# Patient Record
Sex: FEMALE | Race: WHITE | NOT HISPANIC OR LATINO | Employment: FULL TIME | URBAN - METROPOLITAN AREA
[De-identification: names, ages, dates, MRNs, and addresses within clinical notes are randomized per-mention and may not be internally consistent; named-entity substitution may affect disease eponyms.]

---

## 2018-01-14 ENCOUNTER — APPOINTMENT (OUTPATIENT)
Dept: ULTRASOUND IMAGING | Facility: CLINIC | Age: 33
End: 2018-01-14
Attending: EMERGENCY MEDICINE
Payer: COMMERCIAL

## 2018-01-14 ENCOUNTER — HOSPITAL ENCOUNTER (EMERGENCY)
Facility: CLINIC | Age: 33
Discharge: HOME | End: 2018-01-14
Attending: EMERGENCY MEDICINE | Admitting: EMERGENCY MEDICINE
Payer: COMMERCIAL

## 2018-01-14 VITALS
OXYGEN SATURATION: 100 % | RESPIRATION RATE: 20 BRPM | TEMPERATURE: 98.8 F | DIASTOLIC BLOOD PRESSURE: 92 MMHG | HEART RATE: 105 BPM | SYSTOLIC BLOOD PRESSURE: 133 MMHG

## 2018-01-14 DIAGNOSIS — O03.9 COMPLETE MISCARRIAGE: ICD-10-CM

## 2018-01-14 PROCEDURE — 76801 OB US < 14 WKS SINGLE FETUS: CPT

## 2018-01-14 PROCEDURE — 99284 EMERGENCY DEPT VISIT MOD MDM: CPT | Mod: 25

## 2018-01-14 ASSESSMENT — ENCOUNTER SYMPTOMS
FEVER: 0
VOMITING: 0
NAUSEA: 0
RESPIRATORY NEGATIVE: 1

## 2018-01-14 NOTE — ED AVS SNAPSHOT
[facility] Emergency Department    [address]    [facility] [address]    Phone:  [phone]    Fax:  [phone]                                       Dinora Squires   MRN: [id number]    Department:  [facility] Emergency Department   Date of Visit:  1/14/2018           After Visit Summary Signature Page     I have received my discharge instructions, and my questions have been answered. I have discussed any challenges I see with this plan with the nurse or doctor.    ..........................................................................................................................................  Patient/Patient Representative Signature      ..........................................................................................................................................  Patient Representative Print Name and Relationship to Patient    ..................................................               ................................................  Date                                            Time    ..........................................................................................................................................  Reviewed by Signature/Title    ...................................................              ..............................................  Date                                                            Time

## 2018-01-14 NOTE — ED AVS SNAPSHOT
[facility] Emergency Department    [address]    [facility] [address]    Phone:  [phone]    Fax:  [phone]                                       Dinora Squires   MRN: [id number]    Department:  [facility] Emergency Department   Date of Visit:  1/14/2018           Patient Information     Date Of Birth          1985        Your diagnoses for this visit were:     Complete miscarriage        You were seen by Nata Cody MD.      Follow-up Information     Follow up with OB/Gyn.        Follow up with [facility] Emergency Department.    Specialty:  EMERGENCY MEDICINE    Why:  As needed, If symptoms worsen    Contact information:    [address]  [facility] [address] [phone]      Discharge References/Attachments     MISCARRIAGE, SPONTANEOUS (COMPLETED) (ENGLISH)    MISCARRIAGE, UNDERSTANDING: EMOTIONS (ENGLISH)    MISCARRIAGE, UNDERSTANDING: POSSIBLE CAUSES (ENGLISH)      24 Hour Appointment Hotline       To make an appointment at any [facility] clinic, call [phone] ([phone]). If you don't have a family doctor or clinic, we will help you find one. [facility] clinics are conveniently located to serve the needs of you and your family.             Review of your medicines      Notice     You have not been prescribed any medications.            Procedures and tests performed during your visit     US OB <14 Weeks W Transvaginal      Orders Needing Specimen Collection     None      Pending Results     No orders found from 1/12/2018 to 1/15/2018.            Pending Culture Results     No orders found from 1/12/2018 to 1/15/2018.            Pending Results Instructions     If you had any lab results that were not finalized at the time of your Discharge, you can call the ED Lab Result RN at [phone]. You will be contacted by this team for any positive Lab results or changes in treatment. The nurses are  available 7 days a week from 10A to 6:30P.  You can leave a message 24 hours per day and they will return your call.        Test Results From Your Hospital Stay        1/14/2018  5:55 AM      Narrative     US OB <14 WEEKS WITH TRANSVAGINAL SINGLE  1/14/2018 5:50 AM    HISTORY: Bleeding in early pregnancy.    TECHNIQUE: Transabdominal and transvaginal imaging were performed.   Transvaginal imaging was performed to better evaluate the uterus and  gestational sac.    COMPARISON:  None.    FINDINGS:      No gestational sac, yolk sac or embryo. Endometrial stripe thickness  is 1.3 cm.    Right ovary: Unremarkable.  Left ovary: Unremarkable.  Adnexal mass: None.  Free pelvic fluid: None.        Impression     IMPRESSION: No evidence of intrauterine pregnancy.    FLORECITA SAN MD                Clinical Quality Measure: Blood Pressure Screening     Your blood pressure was checked while you were in the emergency department today. The last reading we obtained was  BP: (!) 133/92 . Please read the guidelines below about what these numbers mean and what you should do about them.  If your systolic blood pressure (the top number) is less than 120 and your diastolic blood pressure (the bottom number) is less than 80, then your blood pressure is normal. There is nothing more that you need to do about it.  If your systolic blood pressure (the top number) is 120-139 or your diastolic blood pressure (the bottom number) is 80-89, your blood pressure may be higher than it should be. You should have your blood pressure rechecked within a year by a primary care provider.  If your systolic blood pressure (the top number) is 140 or greater or your diastolic blood pressure (the bottom number) is 90 or greater, you may have high blood pressure. High blood pressure is treatable, but if left untreated over time it can put you at risk for heart attack, stroke, or kidney failure. You should have your blood pressure rechecked by a primary care  "provider within the next 4 weeks.  If your provider in the emergency department today gave you specific instructions to follow-up with your doctor or provider even sooner than that, you should follow that instruction and not wait for up to 4 weeks for your follow-up visit.        Thank you for choosing [facility]       Thank you for choosing [facility] for your care. Our goal is always to provide you with excellent care. Hearing back from our patients is one way we can continue to improve our services. Please take a few minutes to complete the written survey that you may receive in the mail after you visit with us. Thank you!        [employer] Information     [facility] [employer] lets you send messages to your doctor, view your test results, renew your prescriptions, schedule appointments and more. To sign up, go to [email or web address] . Click on \"Log in\" on the left side of the screen, which will take you to the Welcome page. Then click on \"Sign up Now\" on the right side of the page.     You will be asked to enter the access code listed below, as well as some personal information. Please follow the directions to create your username and password.     Your access code is: [email or web address]  Expires: 2018  6:08 AM     Your access code will  in 90 days. If you need help or a new code, please call your [facility] clinic or [phone].        Care EveryWhere ID     This is your Care EveryWhere ID. This could be used by other organizations to access your [facility] medical records  [id number]        Equal Access to Services     JAILYN LEMOS : Hadii leroy lópezo Somiriam, waaxda luqadaha, qaybta kaalmada felisa barnes . So [facility] [phone].    ATENCIÓN: Si habla español, tiene a flowers disposición servicios gratuitos de asistencia lingüística. Llame al [phone].    We comply with applicable federal civil rights laws and [place] laws. We do not discriminate on the basis of race, color, " national origin, age, disability, sex, sexual orientation, or gender identity.            After Visit Summary       This is your record. Keep this with you and show to your community pharmacist(s) and doctor(s) at your next visit.

## 2018-01-14 NOTE — ED PROVIDER NOTES
History     Chief Complaint:  Vaginal Bleeding      HPI   Dinora Squires is a pregnant 32 year old female who presents to the emergency department today for evaluation of vaginal bleeding. The patient reports constant abdominal cramping during her 9 weeks of pregnancy. The patient reports she was seen on Monday by her OBGYN for a similar issue of vaginal bleeding. They performed ultrasounds that came back normal. However, they told her that if her vaginal bleeding turned bright red, she should go into the emergency department. She noticed bright red blood with small clots tonight prompting her visit to the emergency department. She denies fever, nausea, vomiting, respiratory issues.  First pregnancy.     Allergies:  No Known Drug Allergies    Medications:    The patient is currently on no regular medications.    Past Medical History:    History reviewed. No pertinent past medical history.    Past Surgical History:    History reviewed. No pertinent surgical history.    Family History:    History reviewed. No pertinent family history.     Social History:  The patient was accompanied to the ED by .  Marital Status:       Review of Systems   Constitutional: Negative for fever.   Respiratory: Negative.    Gastrointestinal: Negative for nausea and vomiting.   Genitourinary: Positive for vaginal bleeding and vaginal discharge.        Clotting   All other systems reviewed and are negative.      Physical Exam   First Vitals:  BP: (!) 133/92  Pulse: 105  Temp: 98.8  F (37.1  C)  Resp: 20  SpO2: 100 %      Physical Exam  Eyes:  Sclera white; Pupils are equal and round  ENT:    External ears and nares normal  CV:  Rate as above with regular rhythm   Resp:  Breath sounds clear and equal bilaterally    Non-labored, no retractions or accessory muscle use  GI:  Abdomen is soft, non-tender, non-distended    No rebound tenderness or peritoneal features  MS:  Moves all extremities  Skin:  Warm and  dry  Neuro:  Speech is normal and fluent. No apparent deficit.          Emergency Department Course   Imaging:  Radiology findings were communicated with the patient and family who voiced understanding of the findings.  US OB<14 Weeks w Transvag  IMPRESSION: No evidence of intrauterine pregnancy.  Report per radiology     Emergency Department Course:  Nursing notes and vitals reviewed.  The patient was sent for a US OB<14 Weeks w Transvag while in the emergency department, results above.   0439: I performed an exam of the patient as documented above.   0548: 6:26 AM: I spoke with ultrasound regarding patient's presentation, findings, and plan of care.  0608: Patient rechecked and updated.   Findings and plan explained to the Patient and spouse. Patient discharged home with instructions regarding supportive care, medications, and reasons to return. The importance of close follow-up was reviewed.   I personally reviewed the imaging results with the Patient and spouse and answered all related questions prior to discharge.      Impression & Plan    Medical Decision Making:  Dolly Squires is a 32 year old female here for evaluation of cramping and bright red bleeding in first trimester pregnancy. She has had a recent ultrasound showing an IUP, but given the change in her symptoms, repeating this to ensure there was not fetal demise was appropriate. She has paperwork with her stating her blood type is AB+. Given her close following with OB, I don't think there is any indication at this point to be doing STD checks at this time today. Ultrasound was obtained and results were discussed with the ultrasonographer. There is an empty uterus at this point. I discussed this with the patient. She stated that she went to the bathroom right before the ultrasound and passed a much larger bloody mas. I suspect that this was the time she passed the miscarriage. She is not having a significant increase in bleeding after the ultrasound  and I do not suspect any entrapped products at the cervical os. No indication for D&C at this time. Questions about miscarriages were answered and I encouraged her to follow up with her obstetrician.     Diagnosis:    ICD-10-CM    1. Complete miscarriage O03.9        Disposition:  discharged to home    Scribe Disclosure:  CHLOE Marlo Wrenlilly, am serving as a scribe at 4:39 AM on 1/14/2018 to document services personally performed by Nata Cody MD based on my observations and the provider's statements to me.     1/14/2018   [facility] EMERGENCY DEPARTMENT       Nata Cody MD  01/14/18 0283

## 2018-01-14 NOTE — LETTER
[facility] EMERGENCY DEPARTMENT  [address]  [facility] [address]  [phone]      2018    Dinora Squires  [address] [facility] [address]  There are no phone numbers on file.    : 1985      To Whom it may concern:    Dinora Squires was seen in our Emergency Department today, 2018.  Return to work is based on symptom improvement.  Based on this she may not be at work Monday or Tuesday.    Sincerely,      Nata Cody MD

## 2018-11-05 LAB
ABO + RH BLD: NORMAL
ABO + RH BLD: NORMAL
C TRACH DNA SPEC QL PROBE+SIG AMP: NORMAL
HBV SURFACE AG SERPL QL IA: NORMAL
HIV 1+2 AB+HIV1 P24 AG SERPL QL IA: NORMAL
N GONORRHOEA DNA SPEC QL PROBE+SIG AMP: NORMAL
RUBELLA ANTIBODY IGG QUANTITATIVE: NORMAL IU/ML

## 2019-05-31 ENCOUNTER — HOSPITAL ENCOUNTER (INPATIENT)
Facility: CLINIC | Age: 34
LOS: 2 days | Discharge: HOME | End: 2019-06-02
Attending: OBSTETRICS & GYNECOLOGY | Admitting: OBSTETRICS & GYNECOLOGY
Payer: COMMERCIAL

## 2019-05-31 DIAGNOSIS — O13.3 GESTATIONAL HYPERTENSION W/O SIGNIFICANT PROTEINURIA IN 3RD TRIMESTER: Primary | ICD-10-CM

## 2019-05-31 PROBLEM — O60.00 PRETERM LABOR: Status: ACTIVE | Noted: 2019-05-31

## 2019-05-31 LAB
ABO + RH BLD: NORMAL
ABO + RH BLD: NORMAL
BASOPHILS # BLD AUTO: 0 10E9/L (ref 0–0.2)
BASOPHILS NFR BLD AUTO: 0.1 %
DIFFERENTIAL METHOD BLD: ABNORMAL
EOSINOPHIL # BLD AUTO: 0.1 10E9/L (ref 0–0.7)
EOSINOPHIL NFR BLD AUTO: 0.3 %
ERYTHROCYTE [DISTWIDTH] IN BLOOD BY AUTOMATED COUNT: 12.7 % (ref 10–15)
HCT VFR BLD AUTO: 33.8 % (ref 35–47)
HGB BLD-MCNC: 11.6 G/DL (ref 11.7–15.7)
IMM GRANULOCYTES # BLD: 0.2 10E9/L (ref 0–0.4)
IMM GRANULOCYTES NFR BLD: 1.3 %
LYMPHOCYTES # BLD AUTO: 2.1 10E9/L (ref 0.8–5.3)
LYMPHOCYTES NFR BLD AUTO: 13.1 %
MCH RBC QN AUTO: 32 PG (ref 26.5–33)
MCHC RBC AUTO-ENTMCNC: 34.3 G/DL (ref 31.5–36.5)
MCV RBC AUTO: 93 FL (ref 78–100)
MONOCYTES # BLD AUTO: 1 10E9/L (ref 0–1.3)
MONOCYTES NFR BLD AUTO: 5.8 %
NEUTROPHILS # BLD AUTO: 13 10E9/L (ref 1.6–8.3)
NEUTROPHILS NFR BLD AUTO: 79.4 %
NRBC # BLD AUTO: 0 10*3/UL
NRBC BLD AUTO-RTO: 0 /100
PLATELET # BLD AUTO: 297 10E9/L (ref 150–450)
RBC # BLD AUTO: 3.63 10E12/L (ref 3.8–5.2)
SPECIMEN EXP DATE BLD: NORMAL
WBC # BLD AUTO: 16.3 10E9/L (ref 4–11)

## 2019-05-31 PROCEDURE — 96374 THER/PROPH/DIAG INJ IV PUSH: CPT

## 2019-05-31 PROCEDURE — 86780 TREPONEMA PALLIDUM: CPT | Performed by: OBSTETRICS & GYNECOLOGY

## 2019-05-31 PROCEDURE — 25000128 H RX IP 250 OP 636

## 2019-05-31 PROCEDURE — 85025 COMPLETE CBC W/AUTO DIFF WBC: CPT | Performed by: OBSTETRICS & GYNECOLOGY

## 2019-05-31 PROCEDURE — G0378 HOSPITAL OBSERVATION PER HR: HCPCS

## 2019-05-31 PROCEDURE — 86900 BLOOD TYPING SEROLOGIC ABO: CPT | Performed by: OBSTETRICS & GYNECOLOGY

## 2019-05-31 PROCEDURE — 36415 COLL VENOUS BLD VENIPUNCTURE: CPT | Performed by: OBSTETRICS & GYNECOLOGY

## 2019-05-31 PROCEDURE — 59025 FETAL NON-STRESS TEST: CPT

## 2019-05-31 PROCEDURE — 12000000 ZZH R&B MED SURG/OB

## 2019-05-31 PROCEDURE — 86901 BLOOD TYPING SEROLOGIC RH(D): CPT | Performed by: OBSTETRICS & GYNECOLOGY

## 2019-05-31 PROCEDURE — 25000128 H RX IP 250 OP 636: Performed by: OBSTETRICS & GYNECOLOGY

## 2019-05-31 PROCEDURE — G0463 HOSPITAL OUTPT CLINIC VISIT: HCPCS | Mod: 25

## 2019-05-31 PROCEDURE — 96376 TX/PRO/DX INJ SAME DRUG ADON: CPT

## 2019-05-31 PROCEDURE — 25800030 ZZH RX IP 258 OP 636: Performed by: OBSTETRICS & GYNECOLOGY

## 2019-05-31 RX ORDER — ONDANSETRON 2 MG/ML
4 INJECTION INTRAMUSCULAR; INTRAVENOUS EVERY 6 HOURS PRN
Status: DISCONTINUED | OUTPATIENT
Start: 2019-05-31 | End: 2019-06-02 | Stop reason: HOSPADM

## 2019-05-31 RX ORDER — PENICILLIN G POTASSIUM 5000000 [IU]/1
5 INJECTION, POWDER, FOR SOLUTION INTRAMUSCULAR; INTRAVENOUS ONCE
Status: COMPLETED | OUTPATIENT
Start: 2019-05-31 | End: 2019-05-31

## 2019-05-31 RX ORDER — OXYCODONE AND ACETAMINOPHEN 5; 325 MG/1; MG/1
1 TABLET ORAL
Status: DISCONTINUED | OUTPATIENT
Start: 2019-05-31 | End: 2019-06-02 | Stop reason: HOSPADM

## 2019-05-31 RX ORDER — CARBOPROST TROMETHAMINE 250 UG/ML
250 INJECTION, SOLUTION INTRAMUSCULAR
Status: DISCONTINUED | OUTPATIENT
Start: 2019-05-31 | End: 2019-06-02 | Stop reason: HOSPADM

## 2019-05-31 RX ORDER — FENTANYL CITRATE 50 UG/ML
50-100 INJECTION, SOLUTION INTRAMUSCULAR; INTRAVENOUS
Status: DISCONTINUED | OUTPATIENT
Start: 2019-05-31 | End: 2019-06-02 | Stop reason: HOSPADM

## 2019-05-31 RX ORDER — METHYLERGONOVINE MALEATE 0.2 MG/ML
200 INJECTION INTRAVENOUS
Status: DISCONTINUED | OUTPATIENT
Start: 2019-05-31 | End: 2019-06-02 | Stop reason: HOSPADM

## 2019-05-31 RX ORDER — OXYTOCIN 10 [USP'U]/ML
10 INJECTION, SOLUTION INTRAMUSCULAR; INTRAVENOUS
Status: DISCONTINUED | OUTPATIENT
Start: 2019-05-31 | End: 2019-06-02 | Stop reason: HOSPADM

## 2019-05-31 RX ORDER — SODIUM CHLORIDE, SODIUM LACTATE, POTASSIUM CHLORIDE, CALCIUM CHLORIDE 600; 310; 30; 20 MG/100ML; MG/100ML; MG/100ML; MG/100ML
INJECTION, SOLUTION INTRAVENOUS CONTINUOUS
Status: DISCONTINUED | OUTPATIENT
Start: 2019-05-31 | End: 2019-06-02 | Stop reason: HOSPADM

## 2019-05-31 RX ORDER — PRENATAL VIT/IRON FUM/FOLIC AC 27MG-0.8MG
1 TABLET ORAL DAILY
COMMUNITY

## 2019-05-31 RX ORDER — PENICILLIN G POTASSIUM 5000000 [IU]/1
INJECTION, POWDER, FOR SOLUTION INTRAMUSCULAR; INTRAVENOUS
Status: DISCONTINUED
Start: 2019-05-31 | End: 2019-05-31 | Stop reason: HOSPADM

## 2019-05-31 RX ORDER — ACETAMINOPHEN 325 MG/1
650 TABLET ORAL EVERY 4 HOURS PRN
Status: DISCONTINUED | OUTPATIENT
Start: 2019-05-31 | End: 2019-06-02 | Stop reason: HOSPADM

## 2019-05-31 RX ORDER — NALOXONE HYDROCHLORIDE 0.4 MG/ML
.1-.4 INJECTION, SOLUTION INTRAMUSCULAR; INTRAVENOUS; SUBCUTANEOUS
Status: DISCONTINUED | OUTPATIENT
Start: 2019-05-31 | End: 2019-06-02 | Stop reason: HOSPADM

## 2019-05-31 RX ORDER — OXYTOCIN/0.9 % SODIUM CHLORIDE 30/500 ML
100-340 PLASTIC BAG, INJECTION (ML) INTRAVENOUS CONTINUOUS PRN
Status: DISCONTINUED | OUTPATIENT
Start: 2019-05-31 | End: 2019-06-02 | Stop reason: HOSPADM

## 2019-05-31 RX ORDER — IBUPROFEN 400 MG/1
800 TABLET, FILM COATED ORAL
Status: DISCONTINUED | OUTPATIENT
Start: 2019-05-31 | End: 2019-06-02 | Stop reason: HOSPADM

## 2019-05-31 RX ADMIN — PENICILLIN G POTASSIUM 5 MILLION UNITS: 5000000 POWDER, FOR SOLUTION INTRAMUSCULAR; INTRAPLEURAL; INTRATHECAL; INTRAVENOUS at 18:47

## 2019-05-31 RX ADMIN — PENICILLIN G POTASSIUM 5 MILLION UNITS: 5000000 INJECTION, POWDER, FOR SOLUTION INTRAMUSCULAR; INTRAVENOUS at 18:47

## 2019-05-31 RX ADMIN — SODIUM CHLORIDE, POTASSIUM CHLORIDE, SODIUM LACTATE AND CALCIUM CHLORIDE: 600; 310; 30; 20 INJECTION, SOLUTION INTRAVENOUS at 18:48

## 2019-05-31 RX ADMIN — SODIUM CHLORIDE 2.5 MILLION UNITS: 9 INJECTION, SOLUTION INTRAVENOUS at 22:47

## 2019-05-31 SDOH — HEALTH STABILITY: MENTAL HEALTH: HOW OFTEN DO YOU HAVE A DRINK CONTAINING ALCOHOL?: NEVER

## 2019-05-31 ASSESSMENT — MIFFLIN-ST. JEOR: SCORE: 1534.61

## 2019-05-31 ASSESSMENT — ACTIVITIES OF DAILY LIVING (ADL)
COGNITION: 0 - NO COGNITION ISSUES REPORTED
TRANSFERRING: 0-->INDEPENDENT
FALL_HISTORY_WITHIN_LAST_SIX_MONTHS: NO
DRESS: 0-->INDEPENDENT
TOILETING: 0-->INDEPENDENT
BATHING: 0-->INDEPENDENT
RETIRED_COMMUNICATION: 0-->UNDERSTANDS/COMMUNICATES WITHOUT DIFFICULTY
RETIRED_EATING: 0-->INDEPENDENT
AMBULATION: 0-->INDEPENDENT
SWALLOWING: 0-->SWALLOWS FOODS/LIQUIDS WITHOUT DIFFICULTY

## 2019-05-31 NOTE — PLAN OF CARE
Primip at 36+2 sent over from the clinic after SVE 3-4/70/0 for monitoring for 2 hrs and SVE after. Pt denies leaking fluid, no vaginal bleeding, no contractions and good fetal movement. POC discussed with pt and . Monitors applied, assessment obtained. Orientated to room and call light.

## 2019-05-31 NOTE — PROVIDER NOTIFICATION
05/31/19 1810   Provider Notification   Provider Name/Title Dr. Michel   Method of Notification At Bedside   Request Evaluate in Person   Notification Reason SVE   Dr. Michel at bedside. SVE, cervical change. POC discussed with pt. Starting antibiotics per unknown GBS protocol. Pt being admitted overnight for observation.

## 2019-06-01 PROCEDURE — 25800030 ZZH RX IP 258 OP 636: Performed by: OBSTETRICS & GYNECOLOGY

## 2019-06-01 PROCEDURE — 12000000 ZZH R&B MED SURG/OB

## 2019-06-01 PROCEDURE — 25000128 H RX IP 250 OP 636: Performed by: OBSTETRICS & GYNECOLOGY

## 2019-06-01 RX ORDER — CALCIUM CARBONATE 500 MG/1
500 TABLET, CHEWABLE ORAL DAILY PRN
Status: DISCONTINUED | OUTPATIENT
Start: 2019-06-01 | End: 2019-06-02 | Stop reason: HOSPADM

## 2019-06-01 RX ADMIN — SODIUM CHLORIDE 2.5 MILLION UNITS: 9 INJECTION, SOLUTION INTRAVENOUS at 18:35

## 2019-06-01 RX ADMIN — SODIUM CHLORIDE 2.5 MILLION UNITS: 9 INJECTION, SOLUTION INTRAVENOUS at 10:30

## 2019-06-01 RX ADMIN — SODIUM CHLORIDE 2.5 MILLION UNITS: 9 INJECTION, SOLUTION INTRAVENOUS at 02:44

## 2019-06-01 RX ADMIN — SODIUM CHLORIDE, POTASSIUM CHLORIDE, SODIUM LACTATE AND CALCIUM CHLORIDE 125 ML/HR: 600; 310; 30; 20 INJECTION, SOLUTION INTRAVENOUS at 02:48

## 2019-06-01 RX ADMIN — SODIUM CHLORIDE 2.5 MILLION UNITS: 9 INJECTION, SOLUTION INTRAVENOUS at 22:30

## 2019-06-01 RX ADMIN — SODIUM CHLORIDE 2.5 MILLION UNITS: 9 INJECTION, SOLUTION INTRAVENOUS at 07:00

## 2019-06-01 RX ADMIN — SODIUM CHLORIDE 2.5 MILLION UNITS: 9 INJECTION, SOLUTION INTRAVENOUS at 14:35

## 2019-06-01 NOTE — PROGRESS NOTES
"OB Antepartum Note - Hospital Day 2    S:  Patient is doing well.  few contractions.  no vaginal bleeding.  Baby active.  No LOF.  Bowels moving.  Tolerating diet.      O:  /84   Temp 99.2  F (37.3  C) (Temporal)   Resp 16   Ht 1.727 m (5' 8\")   Wt 77.1 kg (170 lb)   BMI 25.85 kg/m    Gen-A&O, NAD  Abd- Gravid, non-tender  EFM-  Baseline 160, accels +, mod variability, no decelerations  [place]- ctx q10 minutes  Cervix-  6/80/0 per Dr Michel exam this am     GBS pending from clinic    A/P: 33 year old  @ 36w3d HD # 2 admitted with ptl     1.  Routine cares  2.  GBS unknown: continue PCN will saline lock in between  3.  Fetal and uterine monitoring: q shift due to stability for the last 6 hrs.     Cash Fitzgerald  2019  11:00 AM     "

## 2019-06-01 NOTE — PLAN OF CARE
Dr. Fitzgerald at bedside at 1050 to assess patient, discussed POC, reviewed strip. Patient having occasional contractions where she feels mild cramping, PCN being given every 4 hours for unknown GBS status. Plan to continue abx, monitor q shift unless any changes. Pt and  state understanding.

## 2019-06-01 NOTE — PROGRESS NOTES
"OB Post-partum Note  PPD# 2    S:  Patient doing well.  Pain controlled.  Voiding.  Bleeding normal.  Breast feeding.    O:  /79   Temp 99  F (37.2  C) (Temporal)   Resp 16   Ht 1.727 m (5' 8\")   Wt 77.1 kg (170 lb)   BMI 25.85 kg/m    Gen- A&O, NAD  Abd- Non-tender, fundus firm at umbilicus  Ext- non-tender, trace edema    Hemoglobin   Date Value Ref Range Status   2019 11.6 (L) 11.7 - 15.7 g/dL Final     AB+  Rubella Immune    A/P: 33 year old  PPD# 2 s/p     1.  Routine post-partum cares.  2.  GHTN - bps normal. F/u bp check 1 week.  3.  Anticipate d/c home on PPD# 2.    Key Michel MD  2019  7:28 AM  "

## 2019-06-01 NOTE — PLAN OF CARE
Pt has no cervical change in 4 hrs. Dr. Michel updated, plan is to observe patient and reassess in the morning.

## 2019-06-01 NOTE — H&P
"OB Brief Admit H&P    No significant change in general health status based on examination of the patient, review of Nursing Admission Database and prenatal record.    Pt is a 33 year old  @ 36w2d who presented to L&D with advanced cervical dilation.  Pt seen in clinic today for routine care and found to be 4 cm dilated. Instructed to present to MAC for r/o PTL. Denied painful contractions, vaginal bleeding, or leakage of fluid. Endorsed active fetal movement.    On evaluation, patient ekv every 4 minutes but denied significant pain. Over 2 hour cervix changed to 5 cm. Currently feeling cramping 2/10 and some pelvic pressure.    Patient's prenatal course has been uncomplicated.    Prenatal Labs:    Blood type AB+  Rubella immune  GCT 94  GBS pending    EFW: 5.5 lbs    /72   Temp 99  F (37.2  C) (Temporal)   Resp 18   Ht 1.727 m (5' 8\")   Wt 77.1 kg (170 lb)   BMI 25.85 kg/m    EFM:  130, moderate variability, ++accels, no decels (category 1)  [place]icken: Q4 min  SVE: 6/80%/0 (still slightly posterior)  Membranes:  intact    Assessment:  33 year old  @ 36w2d admitted for  labor. GBS unknown.    Plan:  1. Admit to labor and delivery   2. PCN for GBS unknown and . Culture sent earlier today.  3. Expectant management for now, consider active management if >6 cm.  4. Ok for epidural.  5. Anticipate .  6. S/p Tdap this pregnancy.    Key Michel MD  2019  9:01 PM      "

## 2019-06-02 VITALS
DIASTOLIC BLOOD PRESSURE: 68 MMHG | WEIGHT: 170 LBS | TEMPERATURE: 98.3 F | BODY MASS INDEX: 25.76 KG/M2 | SYSTOLIC BLOOD PRESSURE: 118 MMHG | HEIGHT: 68 IN | RESPIRATION RATE: 16 BRPM

## 2019-06-02 LAB — T PALLIDUM AB SER QL: NONREACTIVE

## 2019-06-02 PROCEDURE — 25800030 ZZH RX IP 258 OP 636: Performed by: OBSTETRICS & GYNECOLOGY

## 2019-06-02 PROCEDURE — 25000128 H RX IP 250 OP 636: Performed by: OBSTETRICS & GYNECOLOGY

## 2019-06-02 PROCEDURE — 25000132 ZZH RX MED GY IP 250 OP 250 PS 637: Performed by: OBSTETRICS & GYNECOLOGY

## 2019-06-02 RX ADMIN — SODIUM CHLORIDE 2.5 MILLION UNITS: 9 INJECTION, SOLUTION INTRAVENOUS at 06:33

## 2019-06-02 RX ADMIN — SODIUM CHLORIDE 2.5 MILLION UNITS: 9 INJECTION, SOLUTION INTRAVENOUS at 02:35

## 2019-06-02 RX ADMIN — CALCIUM CARBONATE (ANTACID) CHEW TAB 500 MG 500 MG: 500 CHEW TAB at 00:05

## 2019-06-02 NOTE — DISCHARGE INSTRUCTIONS
Discharge Instruction for Undelivered Patients      You were seen for: Cervical change  We Consulted: Dr. Michel  You had (Test or Medicine):Monitoring and SVE     Diet:   Drink 8 to 12 glasses of liquids (milk, juice, water) every day.  You may eat meals and snacks.     Activity:  Rest the pelvic area. No sex. Do not stimulate breasts or nipples.  Call your doctor or nurse midwife if your baby is moving less than usual.     Call your provider if you notice:  Swelling in your face or increased swelling in your hands or legs.  Headaches that are not relieved by Tylenol (acetaminophen).  Changes in your vision (blurring: seeing spots or stars.)  Nausea (sick to your stomach) and vomiting (throwing up).   Weight gain of 5 pounds or more per week.  Heartburn that doesn't go away.  Signs of bladder infection: pain when you urinate (use the toilet), need to go more often and more urgently.  The bag of mosquera (rupture of membranes) breaks, or you notice leaking in your underwear.  Bright red blood in your underwear.  Abdominal (lower belly) or stomach pain.  For first baby: Contractions (tightening) less than 5 minutes apart for one hour or more.  Second (plus) baby: Contractions (tightening) less than 10 minutes apart and getting stronger.  *If less than 34 weeks: Contractions (tightenings) more than 6 times in one hour.  Increase or change in vaginal discharge (note the color and amount)      Follow-up:  Make an appointment to be seen on Tuesday 6/4/2019

## 2019-06-02 NOTE — PROGRESS NOTES
"OB Progress Note  2019  8:20 AM    S:  Pt feeling well. Denies cramping, contractions or leakage of fluid. Endorses some bloody discharge. Baby active.    O:  /68   Temp 98.3  F (36.8  C) (Temporal)   Resp 16   Ht 1.727 m (5' 8\")   Wt 77.1 kg (170 lb)   BMI 25.85 kg/m     Abd - soft non-tender.  EFM: baseline 135, accelerations present, no decelerations, moderate variability; Category 1  [place]:  Ctx q3-4 min  SVE:  6/80%/-1 (posterior)  Membranes: intact    A/P:  33 year old  @36w4d admitted with advanced cervical dilation,  labor. GBS pending.  1.  Advanced cervical dilation - now stable x 24 hours. No evidence of abruption or IUI. Cervix remains posterior and no more than 6 cm.   2.  Plan to discharge home with modified activity.   3.  Follow up 2 days.  4.  Consider IOL 37-38 weeks.  5.  Call for any increase in cramping, contractions, vaginal bleeding, leakage of fluid, or decreased vaginal bleeding.  6.  The plan of care has been discussed with patient and her  who are in agreement.    Key Michel MD    "

## 2019-06-19 ENCOUNTER — HOSPITAL ENCOUNTER (INPATIENT)
Facility: CLINIC | Age: 34
LOS: 1 days | Discharge: HOME | End: 2019-06-20
Attending: OBSTETRICS & GYNECOLOGY | Admitting: OBSTETRICS & GYNECOLOGY
Payer: COMMERCIAL

## 2019-06-19 LAB
ABO + RH BLD: NORMAL
ABO + RH BLD: NORMAL
SPECIMEN EXP DATE BLD: NORMAL
T PALLIDUM AB SER QL: NONREACTIVE

## 2019-06-19 PROCEDURE — 25800030 ZZH RX IP 258 OP 636: Performed by: OBSTETRICS & GYNECOLOGY

## 2019-06-19 PROCEDURE — 25000128 H RX IP 250 OP 636: Performed by: OBSTETRICS & GYNECOLOGY

## 2019-06-19 PROCEDURE — 86901 BLOOD TYPING SEROLOGIC RH(D): CPT | Performed by: OBSTETRICS & GYNECOLOGY

## 2019-06-19 PROCEDURE — 36415 COLL VENOUS BLD VENIPUNCTURE: CPT | Performed by: OBSTETRICS & GYNECOLOGY

## 2019-06-19 PROCEDURE — 25000132 ZZH RX MED GY IP 250 OP 250 PS 637: Performed by: OBSTETRICS & GYNECOLOGY

## 2019-06-19 PROCEDURE — 25000125 ZZHC RX 250: Performed by: OBSTETRICS & GYNECOLOGY

## 2019-06-19 PROCEDURE — 86780 TREPONEMA PALLIDUM: CPT | Performed by: OBSTETRICS & GYNECOLOGY

## 2019-06-19 PROCEDURE — 12000000 ZZH R&B MED SURG/OB

## 2019-06-19 PROCEDURE — 72200001 ZZH LABOR CARE VAGINAL DELIVERY SINGLE

## 2019-06-19 PROCEDURE — 0KQM0ZZ REPAIR PERINEUM MUSCLE, OPEN APPROACH: ICD-10-PCS | Performed by: OBSTETRICS & GYNECOLOGY

## 2019-06-19 PROCEDURE — 86900 BLOOD TYPING SEROLOGIC ABO: CPT | Performed by: OBSTETRICS & GYNECOLOGY

## 2019-06-19 PROCEDURE — 10907ZC DRAINAGE OF AMNIOTIC FLUID, THERAPEUTIC FROM PRODUCTS OF CONCEPTION, VIA NATURAL OR ARTIFICIAL OPENING: ICD-10-PCS | Performed by: OBSTETRICS & GYNECOLOGY

## 2019-06-19 PROCEDURE — G0463 HOSPITAL OUTPT CLINIC VISIT: HCPCS

## 2019-06-19 PROCEDURE — 3E0R3BZ INTRODUCTION OF ANESTHETIC AGENT INTO SPINAL CANAL, PERCUTANEOUS APPROACH: ICD-10-PCS | Performed by: OBSTETRICS & GYNECOLOGY

## 2019-06-19 RX ORDER — AMOXICILLIN 250 MG
1 CAPSULE ORAL 2 TIMES DAILY
Status: DISCONTINUED | OUTPATIENT
Start: 2019-06-19 | End: 2019-06-20 | Stop reason: HOSPADM

## 2019-06-19 RX ORDER — CARBOPROST TROMETHAMINE 250 UG/ML
250 INJECTION, SOLUTION INTRAMUSCULAR
Status: DISCONTINUED | OUTPATIENT
Start: 2019-06-19 | End: 2019-06-19

## 2019-06-19 RX ORDER — AMOXICILLIN 250 MG
2 CAPSULE ORAL 2 TIMES DAILY
Status: DISCONTINUED | OUTPATIENT
Start: 2019-06-19 | End: 2019-06-20 | Stop reason: HOSPADM

## 2019-06-19 RX ORDER — ONDANSETRON 2 MG/ML
4 INJECTION INTRAMUSCULAR; INTRAVENOUS EVERY 6 HOURS PRN
Status: DISCONTINUED | OUTPATIENT
Start: 2019-06-19 | End: 2019-06-19

## 2019-06-19 RX ORDER — NALOXONE HYDROCHLORIDE 0.4 MG/ML
.1-.4 INJECTION, SOLUTION INTRAMUSCULAR; INTRAVENOUS; SUBCUTANEOUS
Status: DISCONTINUED | OUTPATIENT
Start: 2019-06-19 | End: 2019-06-19

## 2019-06-19 RX ORDER — OXYTOCIN/0.9 % SODIUM CHLORIDE 30/500 ML
100 PLASTIC BAG, INJECTION (ML) INTRAVENOUS CONTINUOUS
Status: DISCONTINUED | OUTPATIENT
Start: 2019-06-19 | End: 2019-06-20 | Stop reason: HOSPADM

## 2019-06-19 RX ORDER — OXYTOCIN/0.9 % SODIUM CHLORIDE 30/500 ML
340 PLASTIC BAG, INJECTION (ML) INTRAVENOUS CONTINUOUS PRN
Status: DISCONTINUED | OUTPATIENT
Start: 2019-06-19 | End: 2019-06-20 | Stop reason: HOSPADM

## 2019-06-19 RX ORDER — OXYTOCIN/0.9 % SODIUM CHLORIDE 30/500 ML
100-340 PLASTIC BAG, INJECTION (ML) INTRAVENOUS CONTINUOUS PRN
Status: COMPLETED | OUTPATIENT
Start: 2019-06-19 | End: 2019-06-19

## 2019-06-19 RX ORDER — OXYCODONE AND ACETAMINOPHEN 5; 325 MG/1; MG/1
1 TABLET ORAL
Status: DISCONTINUED | OUTPATIENT
Start: 2019-06-19 | End: 2019-06-19

## 2019-06-19 RX ORDER — LIDOCAINE 40 MG/G
CREAM TOPICAL
Status: DISCONTINUED | OUTPATIENT
Start: 2019-06-19 | End: 2019-06-19

## 2019-06-19 RX ORDER — OXYCODONE HYDROCHLORIDE 5 MG/1
5 TABLET ORAL EVERY 4 HOURS PRN
Status: DISCONTINUED | OUTPATIENT
Start: 2019-06-19 | End: 2019-06-20 | Stop reason: HOSPADM

## 2019-06-19 RX ORDER — IBUPROFEN 800 MG/1
800 TABLET, FILM COATED ORAL
Status: DISCONTINUED | OUTPATIENT
Start: 2019-06-19 | End: 2019-06-19

## 2019-06-19 RX ORDER — OXYTOCIN 10 [USP'U]/ML
10 INJECTION, SOLUTION INTRAMUSCULAR; INTRAVENOUS
Status: DISCONTINUED | OUTPATIENT
Start: 2019-06-19 | End: 2019-06-20 | Stop reason: HOSPADM

## 2019-06-19 RX ORDER — SODIUM CHLORIDE, SODIUM LACTATE, POTASSIUM CHLORIDE, CALCIUM CHLORIDE 600; 310; 30; 20 MG/100ML; MG/100ML; MG/100ML; MG/100ML
INJECTION, SOLUTION INTRAVENOUS CONTINUOUS
Status: DISCONTINUED | OUTPATIENT
Start: 2019-06-19 | End: 2019-06-19

## 2019-06-19 RX ORDER — OXYTOCIN 10 [USP'U]/ML
10 INJECTION, SOLUTION INTRAMUSCULAR; INTRAVENOUS
Status: DISCONTINUED | OUTPATIENT
Start: 2019-06-19 | End: 2019-06-19

## 2019-06-19 RX ORDER — HYDROCORTISONE 2.5 %
CREAM (GRAM) TOPICAL 3 TIMES DAILY PRN
Status: DISCONTINUED | OUTPATIENT
Start: 2019-06-19 | End: 2019-06-20 | Stop reason: HOSPADM

## 2019-06-19 RX ORDER — LIDOCAINE HYDROCHLORIDE 10 MG/ML
INJECTION, SOLUTION EPIDURAL; INFILTRATION; INTRACAUDAL; PERINEURAL
Status: DISCONTINUED
Start: 2019-06-19 | End: 2019-06-19 | Stop reason: HOSPADM

## 2019-06-19 RX ORDER — FENTANYL CITRATE 50 UG/ML
50-100 INJECTION, SOLUTION INTRAMUSCULAR; INTRAVENOUS
Status: DISCONTINUED | OUTPATIENT
Start: 2019-06-19 | End: 2019-06-19

## 2019-06-19 RX ORDER — OXYTOCIN/0.9 % SODIUM CHLORIDE 30/500 ML
PLASTIC BAG, INJECTION (ML) INTRAVENOUS
Status: DISCONTINUED
Start: 2019-06-19 | End: 2019-06-19 | Stop reason: HOSPADM

## 2019-06-19 RX ORDER — BUPIVACAINE HCL/0.9 % NACL/PF 0.125 %
PLASTIC BAG, INJECTION (ML) EPIDURAL
Status: DISCONTINUED
Start: 2019-06-19 | End: 2019-06-19 | Stop reason: HOSPADM

## 2019-06-19 RX ORDER — LANOLIN 100 %
OINTMENT (GRAM) TOPICAL
Status: DISCONTINUED | OUTPATIENT
Start: 2019-06-19 | End: 2019-06-20 | Stop reason: HOSPADM

## 2019-06-19 RX ORDER — ACETAMINOPHEN 325 MG/1
650 TABLET ORAL EVERY 4 HOURS PRN
Status: DISCONTINUED | OUTPATIENT
Start: 2019-06-19 | End: 2019-06-20 | Stop reason: HOSPADM

## 2019-06-19 RX ORDER — METHYLERGONOVINE MALEATE 0.2 MG/ML
200 INJECTION INTRAVENOUS
Status: DISCONTINUED | OUTPATIENT
Start: 2019-06-19 | End: 2019-06-19

## 2019-06-19 RX ORDER — PENICILLIN G POTASSIUM 5000000 [IU]/1
5 INJECTION, POWDER, FOR SOLUTION INTRAMUSCULAR; INTRAVENOUS ONCE
Status: COMPLETED | OUTPATIENT
Start: 2019-06-19 | End: 2019-06-19

## 2019-06-19 RX ORDER — NALOXONE HYDROCHLORIDE 0.4 MG/ML
.1-.4 INJECTION, SOLUTION INTRAMUSCULAR; INTRAVENOUS; SUBCUTANEOUS
Status: DISCONTINUED | OUTPATIENT
Start: 2019-06-19 | End: 2019-06-20 | Stop reason: HOSPADM

## 2019-06-19 RX ORDER — BISACODYL 10 MG
10 SUPPOSITORY, RECTAL RECTAL DAILY PRN
Status: DISCONTINUED | OUTPATIENT
Start: 2019-06-21 | End: 2019-06-20 | Stop reason: HOSPADM

## 2019-06-19 RX ORDER — IBUPROFEN 800 MG/1
800 TABLET, FILM COATED ORAL EVERY 6 HOURS PRN
Status: DISCONTINUED | OUTPATIENT
Start: 2019-06-19 | End: 2019-06-20 | Stop reason: HOSPADM

## 2019-06-19 RX ORDER — ACETAMINOPHEN 325 MG/1
650 TABLET ORAL EVERY 4 HOURS PRN
Status: DISCONTINUED | OUTPATIENT
Start: 2019-06-19 | End: 2019-06-19

## 2019-06-19 RX ADMIN — LIDOCAINE HYDROCHLORIDE 20 ML: 10 INJECTION, SOLUTION EPIDURAL; INFILTRATION; INTRACAUDAL; PERINEURAL at 06:26

## 2019-06-19 RX ADMIN — ACETAMINOPHEN 650 MG: 325 TABLET, FILM COATED ORAL at 10:19

## 2019-06-19 RX ADMIN — IBUPROFEN 800 MG: 800 TABLET ORAL at 13:51

## 2019-06-19 RX ADMIN — SODIUM CHLORIDE, POTASSIUM CHLORIDE, SODIUM LACTATE AND CALCIUM CHLORIDE 1000 ML: 600; 310; 30; 20 INJECTION, SOLUTION INTRAVENOUS at 04:57

## 2019-06-19 RX ADMIN — ACETAMINOPHEN 650 MG: 325 TABLET, FILM COATED ORAL at 16:10

## 2019-06-19 RX ADMIN — IBUPROFEN 800 MG: 800 TABLET ORAL at 06:54

## 2019-06-19 RX ADMIN — OXYTOCIN 340 ML/HR: 10 INJECTION INTRAVENOUS at 06:25

## 2019-06-19 RX ADMIN — IBUPROFEN 800 MG: 800 TABLET ORAL at 19:38

## 2019-06-19 RX ADMIN — PENICILLIN G POTASSIUM 5 MILLION UNITS: 5000000 POWDER, FOR SOLUTION INTRAMUSCULAR; INTRAPLEURAL; INTRATHECAL; INTRAVENOUS at 05:03

## 2019-06-19 ASSESSMENT — ACTIVITIES OF DAILY LIVING (ADL)
DRESS: 0-->INDEPENDENT
RETIRED_EATING: 0-->INDEPENDENT
FALL_HISTORY_WITHIN_LAST_SIX_MONTHS: NO
AMBULATION: 0-->INDEPENDENT
TOILETING: 0-->INDEPENDENT
COGNITION: 0 - NO COGNITION ISSUES REPORTED
TRANSFERRING: 0-->INDEPENDENT
RETIRED_COMMUNICATION: 0-->UNDERSTANDS/COMMUNICATES WITHOUT DIFFICULTY
BATHING: 0-->INDEPENDENT
SWALLOWING: 0-->SWALLOWS FOODS/LIQUIDS WITHOUT DIFFICULTY

## 2019-06-19 NOTE — LACTATION NOTE
This note was copied from a baby's chart.  Early LC visit per RN request.  She was side-lying and attempting to latch infant. LC assisted with latch.  Some latch attempts were noted, but once latched, she moved into a nutritive suck pattern.  Colostrum is easily expressed and used to entice the latch.  Plan for continued support.  She has a plan to breast and bottle feed, but formula is not medically indicated.  At this time, LC only reviewed her good volumes of milk and provided assistance and reassurance.  LC will discuss exclusively breastfeeding further, however, this was infant's first latch following delivery.

## 2019-06-19 NOTE — H&P
OB Brief Admit H&P    No significant change in general health status based on examination of the patient, review of Nursing Admission Database and prenatal record.    Pt is a 33 year old  @ 39w0d who presented to L&D with regular uterine contractions..    Patient's prenatal course has been complicated by PTL with cervical change to 6 cm at 36 weeks. GBS positive.    Prenatal Labs:    Blood type AB+  Rubella immune  GCT 94  GBS positive    EFW: 7 lbs    /69   Temp 98.7  F (37.1  C) (Oral)   Resp 16   EFM:  130, moderate variability, ++accels, no decels (category 1)  [place]: Q3-4  SVE: 8/100%/0  Membranes:  AROM, clear this check    Assessment:  33 year old  @ 39w0d admitted for labor. GBS positive.    Plan:  1. Admit to labor and delivery   2. Ok for nitrous oxide or epidural  3. GBS+ - receiving PCN; will not complete 4 hours prior to delivery.  4. Anticipate   5. S/p Tdap this pregnancy    Key Michel MD  2019

## 2019-06-19 NOTE — PROVIDER NOTIFICATION
Dr Michel at bedside. SVE 9.5. AROM at 0522, clear fluid noted. Plan to begin pushing when patient feels urge. No epidural. MD to stay at bedside.

## 2019-06-19 NOTE — LACTATION NOTE
This note was copied from a baby's chart.  LC follow up to assist with breastfeeding.  She latched on both sides with a nutritive suck pattern.  Minimal assistance was needed.  Plan for continued support and follow up.

## 2019-06-19 NOTE — PLAN OF CARE
Data: Dinora Squires transferred to [facility] via wheelchair at 915. Baby transferred via parent's arms.  Action: Receiving unit notified of transfer: Yes. Patient and family notified of room change. Report given to Alee at 915. Belongings sent to receiving unit. Accompanied by Registered Nurse. Oriented patient to surroundings. Call light within reach. ID bands double-checked with receiving RN.  Response: Patient tolerated transfer and is stable.    Patients mobililty level scored using the bedside mobility assistance tool (BMAT). Patient is at a mobility level test number: 4. Mobility equipment used: none required. Required assist of 1 staff members. Further use of BMAT scoring not required.

## 2019-06-19 NOTE — L&D DELIVERY NOTE
Delivery Date:  2019      BRIEF ISTORY OF PRESENT ILLNESS:  Dinora is a very pleasant 33-year-old female,  3, now para 1-0-2-1, who presented to Labor and Delivery at 39 weeks 0 days' gestation, complaining of regular painful uterine contractions.  The patient had onset of contractions overnight and presented to Labor and Delivery at 8 cm dilated.  She was known to be group B strep positive and was admitted for expectant management of active labor, as well as the administration of penicillin for group B strep prophylaxis.      The patient's prenatal course was notable for routine care at [facility], beginning at 6 weeks' gestation.  She had her pregnancy complicated by  labor and cervical change at 36 weeks 2 days' gestation with an admission overnight at [facility] where her cervix changed from 4, 70, 0 station to 6, 80, and 0 station.  The patient did not progress beyond 6 cm and was discharged home.  She remained at that same cervical exam and was scheduled for induction of later the morning that she presented in labor.  Prenatal course also notable for group B strep positive status.  Other pertinent prenatal labs include blood type AB positive, antibody screen negative, rubella immune, VDRL nonreactive, hepatitis B negative, HIV negative, gonorrhea and chlamydia negative, 1-hour glucose tolerance test 94, and again, group B strep positive.      HOSPITAL COURSE:     FIRST STAGE OF LABOR:  The patient presented in active labor.  She was 8 cm dilated on admission and quickly progressed to 9 cm dilated.  She used nitrous oxide for pain control and did not receive an epidural.  The patient had adequate labor progress and proceeded to completely dilated at 5:34 in the morning.      SECOND STAGE OF LABOR:  I was present prior to the patient becoming complete and for the entire duration of her second stage of labor.  The patient began pushing immediately when she was complete and had  excellent maternal expulsive efforts.  She brought the fetal vertex down to  and ultimately delivered a vigorous viable female infant occiput anterior over intact perineum.  She did have a compound presentation with the right hand and shoulders and body followed without issues.  Delivery was of a vigorous viable female infant, Apgars of 9 at 1 minute and 9 at 5 minutes and weight pending at this time.  Delivery time was 6:22 a.m. and total duration of second stage of labor was 48 minutes.      THIRD STAGE OF LABOR:  The placenta delivered spontaneously and intact with trailing membranes and a 3-vessel cord.  The fundus was initially slightly boggy, but this improved with Pitocin and bimanual massage.  Perineum was inspected and a second-degree midline laceration was noted.  This was infiltrated with local anesthetic and repaired with 3-0 Vicryl in the usual fashion.       The patient tolerated the procedure well.  All sponge and needle counts were correct x 2.  Quantitative blood loss for the procedure was 684 mL.  Mother and infant are resting comfortably without complications.         LYNETTE GARCIA MD             D: 2019   T: 2019   MT: MOHIT      Name:     CHEVY WALSH   MRN:      [id number]        Account:        [id number]   :      1985        Delivery Date:  2019               Document: [id number]

## 2019-06-19 NOTE — PLAN OF CARE
"Report and care received at 0915. Patient with spouse oriented to unit, RRT, safety, etc. Patient has been up to bathroom x 2 steady on feet and able to void without difficulty. Lactation assisted with breast feeding. Patient is rates comfort \"3\". Anticipate discharge PPD 2.   "

## 2019-06-20 VITALS
DIASTOLIC BLOOD PRESSURE: 57 MMHG | SYSTOLIC BLOOD PRESSURE: 123 MMHG | RESPIRATION RATE: 16 BRPM | HEART RATE: 86 BPM | TEMPERATURE: 98.4 F

## 2019-06-20 LAB — HGB BLD-MCNC: 10.4 G/DL (ref 11.7–15.7)

## 2019-06-20 PROCEDURE — 85018 HEMOGLOBIN: CPT | Performed by: OBSTETRICS & GYNECOLOGY

## 2019-06-20 PROCEDURE — 25000132 ZZH RX MED GY IP 250 OP 250 PS 637: Performed by: OBSTETRICS & GYNECOLOGY

## 2019-06-20 PROCEDURE — 36415 COLL VENOUS BLD VENIPUNCTURE: CPT | Performed by: OBSTETRICS & GYNECOLOGY

## 2019-06-20 RX ADMIN — IBUPROFEN 800 MG: 800 TABLET ORAL at 01:10

## 2019-06-20 RX ADMIN — IBUPROFEN 800 MG: 800 TABLET ORAL at 13:38

## 2019-06-20 RX ADMIN — IBUPROFEN 800 MG: 800 TABLET ORAL at 07:09

## 2019-06-20 NOTE — PLAN OF CARE
Independent with self and baby care. Pain well controlled with ibuprofen. Bonding well with baby, FOB helpful and supportive at bs.

## 2019-06-20 NOTE — PLAN OF CARE
Patient complains of tailbone pain with moving.  Is using Ibuprofen and Tylenol with some relief.  Also trying ice pack to area and inflatable doughnut to see if this helps.   is present and supportive.  Will continue to monitor and encourage family time.  Denise Sanchez

## 2019-06-20 NOTE — PLAN OF CARE
"Patient VS are stable and WNL. Patient is ambulating and voiding independently pe patient. Patient pain is well managed with ibuprofen.  Patient has some compliant of tailbone pain that starting after pushing, but per patient \"it is improving.\" Per patient request patient is anticipated to discharge to home today per MD, after  is 36 hours old.   "

## 2019-06-20 NOTE — PROGRESS NOTES
[facility] Obstetrics Post-Partum Progress Note        Interval History:   Doing well.  Pain is adequately controlled.  Vaginal bleeding is normal postpartum flow.  Breastfeeding well.           Significant Problems:      Patient Active Problem List   Diagnosis     Indication for care in labor or delivery      labor     Indication for care in labor and delivery, antepartum      (spontaneous vaginal delivery)             Review of Systems:    The patient denies any chest pain, shortness of breath, excessive pain, fever, chills, nausea or vomiting.          Medications:   All medications related to the patient's surgery have been reviewed          Physical Exam:       Patient Vitals for the past 24 hrs:   BP Temp Temp src Pulse Heart Rate Resp   19 0752 115/56 98.4  F (36.9  C) Oral -- 81 18   19 0016 118/53 98.5  F (36.9  C) Oral 86 -- 16   19 1938 111/59 -- -- 82 -- 16   19 1550 122/66 98.2  F (36.8  C) Oral -- 74 19 1223 113/66 98.8  F (37.1  C) Oral -- 88 19 1003 99/49 98.8  F (37.1  C) Oral -- 93 18   19 0840 116/68 -- -- -- -- --   19 0825 114/70 -- -- -- -- --     All vitals stable   Uterine fundus is firm, non-tender and at the level of the umbilicus  Episiotomy sutures intact and wound healing well  Lower extremities without edema or tenderness          Data:     Lab Results   Component Value Date    HGB 10.4 (L) 2019    HGB 11.6 (L) 2019            Assessment and Plan:    Assessment:    Post-partum day #1  Normal spontaneous vaginal delivery     Doing well.      Plan:   Pain control measures as needed  Anticipate discharge tomorrow     Addendum:  The baby is cleared for discharge today.  Pt requests and is ready for discharge.       Janice Francisco  2019  8:13 AM

## 2019-06-20 NOTE — PROVIDER NOTIFICATION
06/20/19 1110   Provider Notification   Provider Name/Title Dr. Francisco   Method of Notification Phone   Request Evaluate-Remote   Notification Reason Status Update   MD updated on patient requesting to go home after 36 hours today.  Orders received to discharge patient to home today.

## 2019-06-20 NOTE — LACTATION NOTE
This note was copied from a baby's chart.  LC visit. Her baby continues to latch well but has been cluster feeding.  Dinora began supplementing infant with formula today.  LC reviewed the risks of early supplementation when patient asked how it will affect nursing.  LC also discussed options to help bring her milk in with pumping post feeds.  She may also decide to pump and bottle at times.  Education provided, all questions answered and EARNEST encouraged her to call prn.

## 2019-06-20 NOTE — DISCHARGE SUMMARY
Admit Date:     2019   Discharge Date:     2019      DISCHARGE DIAGNOSES:   1.  Intrauterine pregnancy at 36 weeks and 4 days gestation.   2.  Advanced cervical dilation.      DISCHARGE INSTRUCTIONS:  The patient is to follow up in 2 days in the office.       DISCHARGE INSTRUCTIONS:  The patient is to call for fevers, increased contractions increased vaginal pressure, leakage of fluid or vaginal bleeding or decreased fetal movement.      DISCHARGE MEDICATIONS:  Prenatal vitamin 1 p.o. daily.      BRIEF HOSPITAL COURSE:  Chevy Walsh is a very pleasant 33-year-old female,  3, para 0-0-2-0, who presented to Labor and Delivery upon my request at 36 weeks and 4 days gestation as she is found to have advanced cervical dilation in the office.  She was examined and had initially found to be 4 cm dilated and has changed to 5 and 6 cm while she was [facility].  She was kev every 3-4 minutes at most, but was not feeling these contractions.  Fetal heart rate tracing was category 1 and reassuring.  The patient was admitted for monitoring for pre-term labor.  She did not have signs of abruption or infection as she remained afebrile and had no uterine tenderness or vaginal bleeding.      The patient was monitored for 48 hours and had no cervical change beyond 6 cm, 80% effaced and -1 station.  The fetal presenting part was head and it was felt she was safe for discharge home with strict precautions.         LYNETTE GARCIA MD             D: 2019   T: 2019   MT: REBEL      Name:     CHEVY WALSH   MRN:      [id number]        Account:        [id number]   :      1985           Admit Date:     2019                                  Discharge Date: 2019      Document: [id number]

## 2019-06-21 NOTE — PLAN OF CARE
Vitals stable and afebrile. Discharge instructions reviewed with pt and questions answered. Rtc in 6 weeks, home care nurse called to schedule appt. Home with infant and .

## 2020-03-11 ENCOUNTER — HEALTH MAINTENANCE LETTER (OUTPATIENT)
Age: 35
End: 2020-03-11

## 2021-01-03 ENCOUNTER — HEALTH MAINTENANCE LETTER (OUTPATIENT)
Age: 36
End: 2021-01-03

## 2021-04-25 ENCOUNTER — HEALTH MAINTENANCE LETTER (OUTPATIENT)
Age: 36
End: 2021-04-25

## 2021-10-10 ENCOUNTER — HEALTH MAINTENANCE LETTER (OUTPATIENT)
Age: 36
End: 2021-10-10

## 2022-02-08 ENCOUNTER — VIRTUAL VISIT (OUTPATIENT)
Dept: FAMILY MEDICINE | Facility: CLINIC | Age: 37
End: 2022-02-08
Payer: COMMERCIAL

## 2022-02-08 VITALS
HEIGHT: 68 IN | BODY MASS INDEX: 23.34 KG/M2 | HEART RATE: 107 BPM | OXYGEN SATURATION: 98 % | DIASTOLIC BLOOD PRESSURE: 64 MMHG | TEMPERATURE: 98.3 F | SYSTOLIC BLOOD PRESSURE: 108 MMHG | WEIGHT: 154 LBS

## 2022-02-08 DIAGNOSIS — J01.90 ACUTE SINUSITIS WITH SYMPTOMS > 10 DAYS: ICD-10-CM

## 2022-02-08 DIAGNOSIS — J02.9 SORE THROAT: Primary | ICD-10-CM

## 2022-02-08 DIAGNOSIS — Z34.90 PREGNANCY, UNSPECIFIED GESTATIONAL AGE: ICD-10-CM

## 2022-02-08 LAB
DEPRECATED S PYO AG THROAT QL EIA: NEGATIVE
FLUAV AG SPEC QL IA: NEGATIVE
FLUBV AG SPEC QL IA: NEGATIVE
GROUP A STREP BY PCR: NOT DETECTED

## 2022-02-08 PROCEDURE — 99203 OFFICE O/P NEW LOW 30 MIN: CPT | Performed by: NURSE PRACTITIONER

## 2022-02-08 PROCEDURE — 87651 STREP A DNA AMP PROBE: CPT | Performed by: NURSE PRACTITIONER

## 2022-02-08 PROCEDURE — 87804 INFLUENZA ASSAY W/OPTIC: CPT | Performed by: NURSE PRACTITIONER

## 2022-02-08 PROCEDURE — 87804 INFLUENZA ASSAY W/OPTIC: CPT | Mod: 59 | Performed by: NURSE PRACTITIONER

## 2022-02-08 ASSESSMENT — MIFFLIN-ST. JEOR: SCORE: 1447.04

## 2022-02-08 NOTE — LETTER
[facility] PRIOR [address] [id number] [phone]       February 8, 2022    Dinora Squires  [address] [facility] [address]    To Whom it May Concern:    The above patient is unable to attend work for 24 hours due to a medical issue. May return 2/10/22. Please contact me with questions or concerns.      Sincerely,          Hanna Nagy, CATHLEEN-BC

## 2022-02-08 NOTE — PATIENT INSTRUCTIONS
Patient Education     Sinusitis (Antibiotic Treatment)    The sinuses are air-filled spaces within the bones of the face. They connect to the inside of the nose. Sinusitis is an inflammation of the tissue that lines the sinuses. Sinusitis can occur during a cold. It can also happen due to allergies to pollens and other particles in the air. Sinusitis can cause symptoms of sinus congestion and a feeling of fullness. A sinus infection causes fever, headache, and facial pain. There is often green or yellow fluid draining from the nose or into the back of the throat (post-nasal drip). You have been given antibiotics to treat this condition.   Home care    Take the full course of antibiotics as instructed. Don't stop taking them, even when you feel better.    Drink plenty of water, hot tea, and other liquids as directed by the healthcare provider. This may help thin nasal mucus. It also may help your sinuses drain fluids.    Heat may help soothe painful areas of your face. Use a towel soaked in hot water. Or,  the shower and direct the warm spray onto your face. Using a vaporizer along with a menthol rub at night may also help soothe symptoms.     An expectorant with guaifenesin may help thin nasal mucus and help your sinuses drain fluids. Talk with your provider or pharmacists before taking an over-the-counter (OTC) medicine if you have any questions about it or its side effects..    You can use an OTC decongestant, unless a similar medicine was prescribed to you. Nasal sprays work the fastest. Use one that contains phenylephrine or oxymetazoline. First blow your nose gently. Then use the spray. Don't use these medicines more often than directed on the label. If you do, your symptoms may get worse. You may also take pills that contain pseudoephedrine. Don t use products that combine multiple medicines. This is because side effects may be increased. Read labels. You can also ask the pharmacist for help. (People  with high blood pressure should not use decongestants. They can raise blood pressure.) Talk with your provider or pharmacist if you have any questions about the medicine..    OTC antihistamines may help if allergies contributed to your sinusitis. Talk with your provider or pharmacist if you have any questions about the medicine..    Don't use nasal rinses or irrigation during an acute sinus infection, unless your healthcare provider tells you to. Rinsing may spread the infection to other areas in your sinuses.    Use acetaminophen or ibuprofen to control pain, unless another pain medicine was prescribed to you. If you have chronic liver or kidney disease or ever had a stomach ulcer, talk with your healthcare provider before using these medicines. Never give aspirin to anyone under age 18 who is ill with a fever. It may cause severe liver damage.    Don't smoke. This can make symptoms worse.    Follow-up care  Follow up with your healthcare provider, or as advised.   When to seek medical advice  Call your healthcare provider if any of these occur:     Facial pain or headache that gets worse    Stiff neck    Unusual drowsiness or confusion    Swelling of your forehead or eyelids    Symptoms don't go away in 10 days    Vision problems, such as blurred or double vision    Fever of 100.4 F (38 C) or higher, or as directed by your healthcare provider  Call 911  Call 911 if any of these occur:     Seizure    Trouble breathing    Feeling dizzy or faint    Fingernails, skin or lips look blue, purple , or gray  Prevention  Here are steps you can take to help prevent an infection:     Keep good hand washing habits.    Don t have close contact with people who have sore throats, colds, or other upper respiratory infections.    Don t smoke, and stay away from secondhand smoke.    Stay up to date with of your vaccines.  [employer] last reviewed this educational content on 12/1/2019 2000-2021 [employer]. All rights  reserved. This information is not intended as a substitute for professional medical care. Always follow your healthcare professional's instructions.           Patient Education     Earache, No Infection (Adult)   Earaches can happen without an infection. They can occur when air and fluid build up behind the eardrum. They may cause a feeling of fullness and discomfort. They may also impair hearing. This is called otitis media with effusion (OME) or serous otitis media. It means there is fluid in the middle ear. It is not the same as acute otitis media, which is often from an infection.  OME can happen when you have a cold if congestion blocks the passage that drains the middle ear. This passage is called the eustachian tube. OME may also occur with nasal allergies or after a bacterial infection in the middle ear. Other causes are:    Trauma    Improper cleaning of wax from the ear    Bacterial infection of the mastoid bone (mastoiditis)    Tumor    Jaw pain    Changes in pressure, such as from flying or scuba diving    The pain or discomfort may come and go. You may hear clicking or popping sounds when you chew or swallow. You may feel that your balance is off. Or you may hear ringing in the ear.  It often takes from several weeks up to 3 months for the fluid to clear on its own. Oral pain relievers and ear drops help if there is pain. Decongestants and antihistamines sometimes help. Antibiotics don't help since there is no infection. Your healthcare provider may give you a nasal spray to help reduce swelling in the nose and eustachian tube. This can allow the ear to drain.  If your OME doesn't get better after 3 months, surgery may be used to drain the fluid. A small tube may also be put in the eardrum to help with drainage.  Because the middle ear fluid can become infected, watch for signs of an infection. These may develop later. They may include increased ear pain, fever, or drainage from the ear.  Home  care  These home-care tips will help you take care of yourself:    You may use over-the-counter medicine as directed by your healthcare provider to control pain, unless medicine was prescribed. If you have chronic liver or kidney disease or ever had a stomach ulcer or GI bleeding, talk with your healthcare provider before using any medicines.    Aspirin should never be used in anyone younger than age 18 who has a fever. It may cause severe liver damage.    Ask your healthcare provider if you may use over-the-counter decongestants such as phenylephrine or pseudoephedrine. Keep in mind they are not always helpful.    Talk with your healthcare provider about using nasal spray decongestants. Don't use them for more than 3 days, or as directed by your healthcare provider. Longer use can make congestion worse. Prescription nasal sprays from your healthcare provider don't often have such restrictions.    Antihistamines may help if you are also having allergy symptoms.    You may use medicines such as guaifenesin to thin mucus and help with drainage.  Follow-up care  Follow up with your healthcare provider or as advised if you are not feeling better after 3 days.  When to seek medical advice  Call your healthcare provider right away if any of these occur:    Ear pain that gets worse or that does not start to get better     Fever of 100.4 F (38 C) or higher, or as directed by your healthcare provider    Fluid or blood draining from the ear    Headache or sinus pain    Stiff neck    Unusual drowsiness or confusion  [employer] last reviewed this educational content on 12/1/2019 2000-2021 [employer]. All rights reserved. This information is not intended as a substitute for professional medical care. Always follow your healthcare professional's instructions.

## 2022-02-08 NOTE — PROGRESS NOTES
Assessment & Plan     Sore throat  Acute sinusitis with symptoms > 10 days  Pregnancy, unspecified gestational age  Influenza and rapid strep negative.  Had Covid at the beginning of January.  Subsequently developed sinusitis treated with amoxicillin 500 twice daily for 10 days.  Return of her symptoms most bothersome is pharyngitis.  Reassuring exam no higher level of care felt to be needed.   Doppler heart tones normal at 140.  She is feeling her baby move.  No pregnancy concerns.  Will treat with Augmentin twice daily x10 days.  Letter for work.  Encourage rest and fluids.  Encouraged her to reach out to her OB to notify of recent illnesses.  OTC care discussed including Tylenol and saline rinses.  Red flag symptoms discussed and if these occur present to the emergency room or call 911.  Dinora verbalizes understanding of plan of care and is in agreement.   - Streptococcus A Rapid Screen w/Reflex to PCR - Clinic Collect  - Influenza A & B Antigen - Clinic Collect  - amoxicillin-clavulanate (AUGMENTIN) 875-125 MG tablet  Dispense: 20 tablet; Refill: 0  - Group A Streptococcus PCR Throat Swab    Return in about 2 weeks (around 2/22/2022) for Recheck.      ERICA Leos [facility]   Dinora is a 36 year old who presents for the following health issues     HPI     Acute Illness  Acute illness concerns: Sore throat -- Currently pregnant 22 weeks  Onset/Duration: x5 days  Symptoms:  Fever: no  Chills/Sweats: no  Headache (location?): no  Sinus Pressure: YES- much better -- was on Amox for infection  Conjunctivitis:  no  Ear Pain: YES- pain when swallows - worse at night-lingers  Rhinorrhea: YES- yellow/clear  Congestion: YES- nasal - over a month - Covid and Sinus Infection in January  Sore Throat: YES- getting worse - hurts to swallow   Cough: YES - started last night - no much today  Wheeze: no  Decreased Appetite: no  Nausea: no  Vomiting: no  Diarrhea:  "no  Dysuria/Freq.: no  Dysuria or Hematuria: no  Fatigue/Achiness: no  Sick/Strep Exposure: no  -- Covid test Feb 6th and today- both negative  Therapies tried and outcome: Tylenol - moderate relief    At home Dec. 30 was COVID+ PCR [facility] labs Jan. 1st.   Previous Covid vaccination [facility] on 4-12-20  Not boostered.  Does not have influenza vaccination.    Review of Systems   Constitutional, HEENT, cardiovascular, pulmonary, GI, , musculoskeletal, neuro, skin, endocrine and psych systems are negative, except as otherwise noted in the HPI.        Objective    /64 (BP Location: Right arm, Patient Position: Chair, Cuff Size: Adult Regular)   Pulse 107   Temp 98.3  F (36.8  C) (Tympanic)   Ht 1.727 m (5' 8\")   Wt 69.9 kg (154 lb)   SpO2 98%   Breastfeeding No   BMI 23.42 kg/m    Body mass index is 23.42 kg/m .  Physical Exam   GENERAL: healthy, alert and no distress  EYES: Eyes grossly normal to inspection, PERRL and conjunctivae and sclerae normal  HENT: ear canals normal, right TM is dull with absent light reflex, left TM is dull with retraction, nose and mouth without ulcers or lesions  NECK: no adenopathy, no asymmetry, masses, or scars and thyroid normal to palpation  RESP: lungs clear to auscultation - no rales, rhonchi or wheezes  CV: regular rate and rhythm, normal S1 S2, no S3 or S4, no murmur, click or rub, no peripheral edema and peripheral pulses strong.  ABDOMEN: Pregnant abdomen, nontender,Doppler strong fetal heart tones in the 140s. no hepatosplenomegaly, no masses and bowel sounds normal  MS: no gross musculoskeletal defects noted, no edema  SKIN: no suspicious lesions or rashes  NEURO: Normal strength and tone, mentation intact and speech normal  PSYCH: mentation appears normal, affect normal/bright    Results for orders placed or performed in visit on 02/08/22 (from the past 24 hour(s))   Streptococcus A Rapid Screen w/Reflex to PCR - Clinic Collect    Specimen: Throat; Swab   Result Value " Ref Range    Group A Strep antigen Negative Negative   Influenza A & B Antigen - Clinic Collect    Specimen: Nasopharyngeal; Swab   Result Value Ref Range    Influenza A antigen Negative Negative    Influenza B antigen Negative Negative    Narrative    Test results must be correlated with clinical data. If necessary, results should be confirmed by a molecular assay or viral culture.

## 2022-02-08 NOTE — RESULT ENCOUNTER NOTE
Dear Dinora,    Here is a summary of your recent test results:    Your strep culture is negative.     For additional lab test information, labtestsonline.org is an excellent reference.    In addition, here is a list of due or overdue Health Maintenance reminders:    Preventive Care Visit Never done  ANNUAL REVIEW OF HM ORDERS Never done  Discuss Advance Care Planning Never done  PAP Smear Never done  Diptheria Tetanus Pertussis (DTAP/TDAP/TD) Vaccine(1 - Tdap) Never done  COVID-19 Vaccine(2 - Booster for Neil series) due on 06/07/2021  Flu Vaccine(1) due on 09/01/2021  PHQ-2 Never done    Please call us at [phone] (or use [employer]) to address the above recommendations if needed.    Thank you for choosing [facility].  It was an honor and a privilege to participate in your care.     Healthy regards,    Hanna Nagy, CATHLEEN  [facility]

## 2022-02-08 NOTE — LETTER
February 14, 2022      Dinoraadebayo Koemi  [address] [address] [facility] [address]        Dear ,    We are writing to inform you of your test results.    Here is a summary of your recent test results:we were unable to get a hold of you on your mychart.       Your strep culture is negative.       For additional lab test information, [email or web address] is an excellent reference.       In addition, here is a list of due or overdue Health Maintenance reminders:       Preventive Care Visit Never done   ANNUAL REVIEW OF HM ORDERS Never done   Discuss Advance Care Planning Never done   PAP Smear Never done   Diptheria Tetanus Pertussis (DTAP/TDAP/TD) Vaccine(1 - Tdap) Never done   COVID-19 Vaccine(2 - Booster for Neil series) due on 06/07/2021   Flu Vaccine(1) due on 09/01/2021   PHQ-2 Never done       Please call us at [phone] (or use [employer]) to address the above recommendations if needed.       Resulted Orders   Streptococcus A Rapid Screen w/Reflex to PCR - Clinic Collect   Result Value Ref Range    Group A Strep antigen Negative Negative   Influenza A & B Antigen - Clinic Collect   Result Value Ref Range    Influenza A antigen Negative Negative    Influenza B antigen Negative Negative    Narrative    Test results must be correlated with clinical data. If necessary, results should be confirmed by a molecular assay or viral culture.       If you have any questions or concerns, please call the clinic at the number listed above.       Sincerely,      ERICA Xie CNP

## 2022-02-13 NOTE — RESULT ENCOUNTER NOTE
Note to Staff: please send a result letter    Dear Dinora,     Here is a summary of your recent test results:we were unable to get a hold of you on your mychart.      Your strep culture is negative.      For additional lab test information, [email or web address] is an excellent reference.     In addition, here is a list of due or overdue Health Maintenance reminders:     Preventive Care Visit Never done  ANNUAL REVIEW OF HM ORDERS Never done  Discuss Advance Care Planning Never done  PAP Smear Never done  Diptheria Tetanus Pertussis (DTAP/TDAP/TD) Vaccine(1 - Tdap) Never done  COVID-19 Vaccine(2 - Booster for Neil series) due on 06/07/2021  Flu Vaccine(1) due on 09/01/2021  PHQ-2 Never done     Please call us at [phone] (or use [facility]) to address the above recommendations if needed.     Thank you for choosing [facility].  It was an honor and a privilege to participate in your care.      Healthy regards,     Hanna Nagy, CATHLEEN  [facility]

## 2022-02-23 ENCOUNTER — HOSPITAL ENCOUNTER (OUTPATIENT)
Facility: CLINIC | Age: 37
End: 2022-02-23
Admitting: OBSTETRICS & GYNECOLOGY
Payer: COMMERCIAL

## 2022-02-23 ENCOUNTER — HOSPITAL ENCOUNTER (OUTPATIENT)
Facility: CLINIC | Age: 37
Discharge: HOME | End: 2022-02-23
Attending: OBSTETRICS & GYNECOLOGY | Admitting: OBSTETRICS & GYNECOLOGY
Payer: COMMERCIAL

## 2022-02-23 VITALS
RESPIRATION RATE: 18 BRPM | HEIGHT: 68 IN | TEMPERATURE: 99.9 F | HEART RATE: 100 BPM | BODY MASS INDEX: 24.25 KG/M2 | WEIGHT: 160 LBS

## 2022-02-23 DIAGNOSIS — Z36.89 ENCOUNTER FOR TRIAGE IN PREGNANT PATIENT: Primary | ICD-10-CM

## 2022-02-23 LAB
ALBUMIN UR-MCNC: 30 MG/DL
APPEARANCE UR: ABNORMAL
BACTERIA #/AREA URNS HPF: ABNORMAL /HPF
BILIRUB UR QL STRIP: NEGATIVE
COLOR UR AUTO: YELLOW
GLUCOSE UR STRIP-MCNC: NEGATIVE MG/DL
HGB UR QL STRIP: NEGATIVE
KETONES UR STRIP-MCNC: >150 MG/DL
LEUKOCYTE ESTERASE UR QL STRIP: ABNORMAL
MUCOUS THREADS #/AREA URNS LPF: PRESENT /LPF
NITRATE UR QL: NEGATIVE
PH UR STRIP: 6 [PH] (ref 5–7)
RBC URINE: 2 /HPF
SP GR UR STRIP: 1.03 (ref 1–1.03)
SQUAMOUS EPITHELIAL: 62 /HPF
UROBILINOGEN UR STRIP-MCNC: NORMAL MG/DL
WBC URINE: 32 /HPF

## 2022-02-23 PROCEDURE — 250N000013 HC RX MED GY IP 250 OP 250 PS 637: Performed by: OBSTETRICS & GYNECOLOGY

## 2022-02-23 PROCEDURE — G0463 HOSPITAL OUTPT CLINIC VISIT: HCPCS

## 2022-02-23 PROCEDURE — 250N000011 HC RX IP 250 OP 636: Performed by: OBSTETRICS & GYNECOLOGY

## 2022-02-23 PROCEDURE — 81001 URINALYSIS AUTO W/SCOPE: CPT | Performed by: OBSTETRICS & GYNECOLOGY

## 2022-02-23 PROCEDURE — 87086 URINE CULTURE/COLONY COUNT: CPT | Performed by: OBSTETRICS & GYNECOLOGY

## 2022-02-23 RX ORDER — NITROFURANTOIN 25; 75 MG/1; MG/1
100 CAPSULE ORAL 2 TIMES DAILY
Qty: 14 CAPSULE | Refills: 0 | Status: SHIPPED | OUTPATIENT
Start: 2022-02-24 | End: 2022-03-03

## 2022-02-23 RX ORDER — ACETAMINOPHEN 325 MG/1
650 TABLET ORAL EVERY 6 HOURS PRN
COMMUNITY

## 2022-02-23 RX ORDER — ACETAMINOPHEN 325 MG/1
975 TABLET ORAL EVERY 4 HOURS PRN
Status: DISCONTINUED | OUTPATIENT
Start: 2022-02-23 | End: 2022-02-23 | Stop reason: HOSPADM

## 2022-02-23 RX ORDER — ONDANSETRON 4 MG/1
4 TABLET, FILM COATED ORAL EVERY 6 HOURS PRN
Status: DISCONTINUED | OUTPATIENT
Start: 2022-02-23 | End: 2022-02-23 | Stop reason: ALTCHOICE

## 2022-02-23 RX ORDER — ONDANSETRON 4 MG/1
4 TABLET, ORALLY DISINTEGRATING ORAL EVERY 6 HOURS PRN
Status: DISCONTINUED | OUTPATIENT
Start: 2022-02-23 | End: 2022-02-23 | Stop reason: HOSPADM

## 2022-02-23 RX ORDER — LIDOCAINE 40 MG/G
CREAM TOPICAL
Status: DISCONTINUED | OUTPATIENT
Start: 2022-02-23 | End: 2022-02-23 | Stop reason: HOSPADM

## 2022-02-23 RX ADMIN — ONDANSETRON 4 MG: 4 TABLET, ORALLY DISINTEGRATING ORAL at 16:39

## 2022-02-23 RX ADMIN — ACETAMINOPHEN 975 MG: 325 TABLET, FILM COATED ORAL at 17:06

## 2022-02-23 NOTE — CARE PLAN
Patient tolerating ice chips, nausea better after Zofran able to take po Tylenol for back discomfort, back pain is generalized through back able to reposition for comfort, abdomen soft no contractions, baby actively moving, Tylenol has help relieve back pain, no contractions, nausea better, phone update to Dr Ward  Script sent to [facility] [employer] and patient knows to start tomorrow, all printed out discharge instructions verbally reviewed and patient agrees to understanding, dressed self to go and in good spirits discharged ambulatory to front door entered family car destination home

## 2022-02-23 NOTE — CARE PLAN
Data: Patient assessed in the [facility] for back pain, loose stools and vomiting since 2100 02/22    Membranes intact.  Contractions/uterine assessment palpates soft baby actively moving  Action:  EFM/EUM  baby actively moving, abdomen palpates soft, nontender, no contractions reported or felt by examiner  UA obtained and sent, patient taking po ice chips  Response:   Patient verbalized understanding of education and verbalized agreement with plan.  Dr Cuello given phone update, patient had emesis just prior to zofran

## 2022-02-24 NOTE — DISCHARGE INSTRUCTIONS
Discharge Instruction for Undelivered Patients      You were seen for: nausea, vomiting, back pain  We Consulted: Dr JIMMIE Cuello/Dr LEXIS Ward  You had (Test or Medicine): fetal check, uterine monitoring, UA, Macrobid eprescribed    Diet:   Slowly advance diet as tolerated     Activity:  As usual    Call your provider if you notice:  Swelling in your face or increased swelling in your hands or legs.  Headaches that are not relieved by Tylenol (acetaminophen).  Changes in your vision (blurring: seeing spots or stars.)  Nausea (sick to your stomach) and vomiting (throwing up).   Heartburn that doesn't go away.  The bag of mosquera (rupture of membranes) breaks, or you notice leaking in your underwear.  Bright red blood in your underwear.  Abdominal (lower belly) or stomach pain.  *If less than 34 weeks: Contractions (tightening) more than 6 times in one hour.  Increase or change in vaginal discharge (note the color and amount    Follow-up:  With [facility] and or appointment Friday as scheduled

## 2022-02-25 ENCOUNTER — MEDICAL CORRESPONDENCE (OUTPATIENT)
Dept: HEALTH INFORMATION MANAGEMENT | Facility: CLINIC | Age: 37
End: 2022-02-25
Payer: COMMERCIAL

## 2022-02-25 ENCOUNTER — TRANSFERRED RECORDS (OUTPATIENT)
Dept: HEALTH INFORMATION MANAGEMENT | Facility: CLINIC | Age: 37
End: 2022-02-25
Payer: COMMERCIAL

## 2022-02-25 LAB — BACTERIA UR CULT: NORMAL

## 2022-02-28 ENCOUNTER — TRANSCRIBE ORDERS (OUTPATIENT)
Dept: MATERNAL FETAL MEDICINE | Facility: CLINIC | Age: 37
End: 2022-02-28
Payer: COMMERCIAL

## 2022-02-28 DIAGNOSIS — O26.90 PREGNANCY RELATED CONDITION, ANTEPARTUM: Primary | ICD-10-CM

## 2022-03-02 ENCOUNTER — PRE VISIT (OUTPATIENT)
Dept: MATERNAL FETAL MEDICINE | Facility: CLINIC | Age: 37
End: 2022-03-02
Payer: COMMERCIAL

## 2022-03-04 ENCOUNTER — HOSPITAL ENCOUNTER (OUTPATIENT)
Dept: ULTRASOUND IMAGING | Facility: CLINIC | Age: 37
End: 2022-03-04
Attending: OBSTETRICS & GYNECOLOGY
Payer: COMMERCIAL

## 2022-03-04 ENCOUNTER — OFFICE VISIT (OUTPATIENT)
Dept: MATERNAL FETAL MEDICINE | Facility: CLINIC | Age: 37
End: 2022-03-04
Attending: OBSTETRICS & GYNECOLOGY
Payer: COMMERCIAL

## 2022-03-04 ENCOUNTER — LAB (OUTPATIENT)
Dept: LAB | Facility: CLINIC | Age: 37
End: 2022-03-04
Attending: OBSTETRICS & GYNECOLOGY
Payer: COMMERCIAL

## 2022-03-04 DIAGNOSIS — O43.122 VELAMENTOUS INSERTION OF UMBILICAL CORD IN SECOND TRIMESTER: ICD-10-CM

## 2022-03-04 DIAGNOSIS — O26.90 PREGNANCY RELATED CONDITION, ANTEPARTUM: ICD-10-CM

## 2022-03-04 DIAGNOSIS — O35.9XX0 FETAL ABNORMALITY AFFECTING MANAGEMENT OF MOTHER, SINGLE OR UNSPECIFIED FETUS: Primary | ICD-10-CM

## 2022-03-04 DIAGNOSIS — O35.9XX0 FETAL ABNORMALITY AFFECTING MANAGEMENT OF MOTHER, SINGLE OR UNSPECIFIED FETUS: ICD-10-CM

## 2022-03-04 PROCEDURE — 86644 CMV ANTIBODY: CPT

## 2022-03-04 PROCEDURE — 76811 OB US DETAILED SNGL FETUS: CPT

## 2022-03-04 PROCEDURE — 86645 CMV ANTIBODY IGM: CPT

## 2022-03-04 PROCEDURE — 36415 COLL VENOUS BLD VENIPUNCTURE: CPT

## 2022-03-04 PROCEDURE — 76811 OB US DETAILED SNGL FETUS: CPT | Mod: 26 | Performed by: OBSTETRICS & GYNECOLOGY

## 2022-03-04 NOTE — PROGRESS NOTES
"Please see \"Imaging\" tab under \"Chart Review\" for details of today's US at the [facility].    Nirav Richmond MD  Maternal-Fetal Medicine      "

## 2022-03-07 LAB
CMV IGG SERPL IA-ACNC: 7.5 U/ML
CMV IGG SERPL IA-ACNC: ABNORMAL
CMV IGM SERPL IA-ACNC: 8.9 AU/ML
CMV IGM SERPL IA-ACNC: NEGATIVE

## 2022-03-08 LAB — CMV IGG AVIDITY SERPL IA-RTO: 0.91 %

## 2022-03-24 ENCOUNTER — TELEPHONE (OUTPATIENT)
Dept: FAMILY MEDICINE | Facility: CLINIC | Age: 37
End: 2022-03-24
Payer: COMMERCIAL

## 2022-03-24 NOTE — LETTER
[facility] - [address] [address]  [phone]  March 24, 2022    Dinora Squires  [id number] [facility] [address]    Dear Dinora,    This is a reminder that you are due for a Wellness Visit (annual full physical).   So that you get your desired appointment time, please call [phone] to schedule this or you can use [employer] if you have an account. If you have had this visit completed elsewhere, or you believe you received this letter in error, please contact us at [phone].    In addition, here is a list of due or overdue Health Maintenance reminders.    Health Maintenance Due   Topic Date Due     Preventive Care Visit  Never done     ANNUAL REVIEW OF HM ORDERS  Never done     Discuss Advance Care Planning  Never done     PAP Smear  Never done     Diptheria Tetanus Pertussis (DTAP/TDAP/TD) Vaccine (1 - Tdap) Never done     COVID-19 Vaccine (2 - Booster for Neil series) 06/07/2021     Flu Vaccine (1) 09/01/2021     MATERNAL SCREENING  Never done     PHQ-2 (once per calendar year)  Never done     Gestational Diabetes Screening  Never done     REPEAT ANTIBODY SCREEN (OB)  Never done       Best Regards,    Hanna Nagy, JAVIERP-BC

## 2022-03-24 NOTE — TELEPHONE ENCOUNTER
Needs of attention regarding:  -Wellness (Physical) Visit     Health Maintenance Topics with due status: Overdue       Topic Date Due    PREVENTIVE CARE VISIT Never done    ANNUAL REVIEW OF HM ORDERS Never done    ADVANCE CARE PLANNING Never done    PAP Never done    DTAP/TDAP/TD IMMUNIZATION Never done    COVID-19 Vaccine 06/07/2021    INFLUENZA VACCINE 09/01/2021    MATERNAL SCREENING Never done    PHQ-2 (once per calendar year) Never done    OBGCT (OB) Never done    REPEAT ANTIBODY SCREEN (OB) Never done       Communication:  See Letter

## 2022-05-21 ENCOUNTER — HEALTH MAINTENANCE LETTER (OUTPATIENT)
Age: 37
End: 2022-05-21

## 2022-05-31 ENCOUNTER — APPOINTMENT (OUTPATIENT)
Dept: ULTRASOUND IMAGING | Facility: CLINIC | Age: 37
End: 2022-05-31
Attending: OBSTETRICS & GYNECOLOGY
Payer: COMMERCIAL

## 2022-05-31 ENCOUNTER — HOSPITAL ENCOUNTER (OUTPATIENT)
Facility: CLINIC | Age: 37
Discharge: HOME | End: 2022-05-31
Attending: OBSTETRICS & GYNECOLOGY | Admitting: OBSTETRICS & GYNECOLOGY
Payer: COMMERCIAL

## 2022-05-31 VITALS — TEMPERATURE: 97.5 F | SYSTOLIC BLOOD PRESSURE: 128 MMHG | DIASTOLIC BLOOD PRESSURE: 77 MMHG | RESPIRATION RATE: 16 BRPM

## 2022-05-31 PROCEDURE — G0463 HOSPITAL OUTPT CLINIC VISIT: HCPCS | Mod: 25

## 2022-05-31 PROCEDURE — 59025 FETAL NON-STRESS TEST: CPT | Mod: 59

## 2022-05-31 PROCEDURE — 59025 FETAL NON-STRESS TEST: CPT

## 2022-05-31 PROCEDURE — 999N000105 HC STATISTIC NO DOCUMENTATION TO SUPPORT CHARGE

## 2022-05-31 PROCEDURE — 76819 FETAL BIOPHYS PROFIL W/O NST: CPT

## 2022-05-31 RX ORDER — LIDOCAINE 40 MG/G
CREAM TOPICAL
Status: DISCONTINUED | OUTPATIENT
Start: 2022-05-31 | End: 2022-05-31 | Stop reason: HOSPADM

## 2022-05-31 NOTE — PROVIDER NOTIFICATION
Dr Debbie Kaminski informed of patient arrival and assessment including the following:    Reason for maternal/fetal assessment decreased fetal movement. Pt reports not as much fetal movement since waking up this am. Ate breakfast at 0800. States baby is normally very active this time of day. Fetal status normal baseline, moderate variability, accelerations present and no decelerations. Plan per provider/orders received for BPP due and discharge to home if unremarkable.

## 2022-05-31 NOTE — PLAN OF CARE
Data: Patient presented to [facility]: 2022 10:43 AM.  Reason for maternal/fetal assessment is decreased fetal movement. Patient reports not as much fetal movement since waking up this am. Ate breakfast at 0800. States baby is normally very active this time of day.  Patient is a .  Prenatal record reviewed. Pregnancy  has been complicated by velamentous cord insertion, anxiety and depression (no medicated), GBS positive with hx of fast labor.  Gestational Age 38w0d. VSS. Fetal movement decreased since this morning. Patient denies leaking of vaginal fluid/rupture of membranes, vaginal bleeding, abdominal pain, pelvic pressure, nausea, vomiting, headache, visual disturbances, epigastric or URQ pain, significant edema. Support person is not present.   Action: Verbal consent for EFM. Triage assessment completed. Bill of rights reviewed.  Response: Patient verbalized agreement with plan. Will contact Dr Debbie Kaminski with update and for further orders.

## 2022-05-31 NOTE — PLAN OF CARE
Data: Patient presented to the [facility] at 1043.   Reason for maternal/fetal assessment per patient is Decreased Fetal Movement  . Patient is a . Prenatal record reviewed.      OB History    Para Term  AB Living   4 1 1 0 2 1   SAB IAB Ectopic Multiple Live Births   2 0 0 0 1      # Outcome Date GA Lbr Tino/2nd Weight Sex Delivery Anes PTL Lv   4 Current            3 Term 19 39w0d 05:20 / 01:02 3.395 kg (7 lb 7.8 oz) F Vag-Spont Nitrous  JOSE LUIS      Complications: GBS      Name: JILLIAN,FEMALE-CHEVY      Apgar1: 9  Apgar5: 9   2 SAB            1 SAB               Medical History:   Past Medical History:   Diagnosis Date     Depressive disorder     hx of depression 10 yrs ago   . Gestational Age 38w0d. VSS. Cervix: not examined.  Fetal movement present. Patient denies cramping, backache, vaginal discharge, pelvic pressure, UTI symptoms, GI problems, bloody show, vaginal bleeding, edema, headache, visual disturbances, epigastric or URQ pain, abdominal pain, rupture of membranes. Support persons not present.  Action: Verbal consent for EFM. Triage assessment completed. EFM applied for fetal wellbeing. Uterine assessment with TOCO. Fetal assessment: Presumed adequate fetal oxygenation documented (see flow record). Patient instructed to report change in fetal movement, vaginal leaking of fluid or bleeding, abdominal pain, or any concerns related to the pregnancy to her nurse/physician.   Response: Dr. Kaminski informed of see note. Plan per provider is discharge to home with follow up BPP in clinic tomorrow. Pt will call clinic for reevaluation if movements further decrease or stop. Patient verbalized understanding of education and verbalized agreement with plan. Discharged ambulatory at 1215.

## 2022-05-31 NOTE — PLAN OF CARE
BPP 8/8. Pt still not feeling many movements. Per MD will schedule a BPP in clinic tomorrow, mostly for pt's peace of mind, will also keep BPP appt for Friday.

## 2022-05-31 NOTE — DISCHARGE INSTRUCTIONS
Discharge Instruction for Undelivered Patients      You were seen for:  decreased fetal movement  We Consulted: Dr Kaminski  You had (Test or Medicine):NST, BPP     Diet:   Drink 8 to 12 glasses of liquids (milk, juice, water) every day.     Activity:  Call your doctor or nurse midwife if your baby is moving less than usual.     Call your provider if you notice:  Swelling in your face or increased swelling in your hands or legs.  Headaches that are not relieved by Tylenol (acetaminophen).  Changes in your vision (blurring: seeing spots or stars.)  Nausea (sick to your stomach) and vomiting (throwing up).   Weight gain of 5 pounds or more per week.  Heartburn that doesn't go away.  Signs of bladder infection: pain when you urinate (use the toilet), need to go more often and more urgently.  The bag of mosquera (rupture of membranes) breaks, or you notice leaking in your underwear.  Bright red blood in your underwear.  Abdominal (lower belly) or stomach pain.  For first baby: Contractions (tightening) less than 5 minutes apart for one hour or more.  Second (plus) baby: Contractions (tightening) less than 10 minutes apart and getting stronger.  *If less than 34 weeks: Contractions (tightening) more than 6 times in one hour.  Increase or change in vaginal discharge (note the color and amount)      Follow-up:  As scheduled in the clinic

## 2022-06-03 ENCOUNTER — LAB (OUTPATIENT)
Dept: LAB | Facility: CLINIC | Age: 37
End: 2022-06-03
Attending: OBSTETRICS & GYNECOLOGY
Payer: COMMERCIAL

## 2022-06-03 DIAGNOSIS — Z33.1 PREGNANCY, INCIDENTAL: ICD-10-CM

## 2022-06-03 DIAGNOSIS — Z33.1 PREGNANCY, INCIDENTAL: Primary | ICD-10-CM

## 2022-06-03 PROCEDURE — U0005 INFEC AGEN DETEC AMPLI PROBE: HCPCS

## 2022-06-04 LAB — SARS-COV-2 RNA RESP QL NAA+PROBE: NEGATIVE

## 2022-06-06 ENCOUNTER — HOSPITAL ENCOUNTER (INPATIENT)
Facility: CLINIC | Age: 37
LOS: 2 days | Discharge: HOME | End: 2022-06-08
Attending: OBSTETRICS & GYNECOLOGY | Admitting: OBSTETRICS & GYNECOLOGY
Payer: COMMERCIAL

## 2022-06-06 LAB
ABO/RH(D): NORMAL
ANTIBODY SCREEN: NEGATIVE
HOLD SPECIMEN: NORMAL
SPECIMEN EXPIRATION DATE: NORMAL

## 2022-06-06 PROCEDURE — 258N000003 HC RX IP 258 OP 636: Performed by: OBSTETRICS & GYNECOLOGY

## 2022-06-06 PROCEDURE — 250N000009 HC RX 250: Performed by: OBSTETRICS & GYNECOLOGY

## 2022-06-06 PROCEDURE — 722N000001 HC LABOR CARE VAGINAL DELIVERY SINGLE

## 2022-06-06 PROCEDURE — 86850 RBC ANTIBODY SCREEN: CPT | Performed by: OBSTETRICS & GYNECOLOGY

## 2022-06-06 PROCEDURE — 10907ZC DRAINAGE OF AMNIOTIC FLUID, THERAPEUTIC FROM PRODUCTS OF CONCEPTION, VIA NATURAL OR ARTIFICIAL OPENING: ICD-10-PCS | Performed by: OBSTETRICS & GYNECOLOGY

## 2022-06-06 PROCEDURE — 0KQM0ZZ REPAIR PERINEUM MUSCLE, OPEN APPROACH: ICD-10-PCS | Performed by: OBSTETRICS & GYNECOLOGY

## 2022-06-06 PROCEDURE — 250N000011 HC RX IP 250 OP 636: Performed by: OBSTETRICS & GYNECOLOGY

## 2022-06-06 PROCEDURE — 120N000001 HC R&B MED SURG/OB

## 2022-06-06 PROCEDURE — 86780 TREPONEMA PALLIDUM: CPT | Performed by: OBSTETRICS & GYNECOLOGY

## 2022-06-06 RX ORDER — OXYTOCIN/0.9 % SODIUM CHLORIDE 30/500 ML
340 PLASTIC BAG, INJECTION (ML) INTRAVENOUS CONTINUOUS PRN
Status: DISCONTINUED | OUTPATIENT
Start: 2022-06-06 | End: 2022-06-08 | Stop reason: HOSPADM

## 2022-06-06 RX ORDER — IBUPROFEN 800 MG/1
800 TABLET, FILM COATED ORAL EVERY 6 HOURS PRN
Status: DISCONTINUED | OUTPATIENT
Start: 2022-06-06 | End: 2022-06-08 | Stop reason: HOSPADM

## 2022-06-06 RX ORDER — ONDANSETRON 2 MG/ML
4 INJECTION INTRAMUSCULAR; INTRAVENOUS EVERY 6 HOURS PRN
Status: DISCONTINUED | OUTPATIENT
Start: 2022-06-06 | End: 2022-06-06 | Stop reason: HOSPADM

## 2022-06-06 RX ORDER — BISACODYL 10 MG
10 SUPPOSITORY, RECTAL RECTAL DAILY PRN
Status: DISCONTINUED | OUTPATIENT
Start: 2022-06-06 | End: 2022-06-08 | Stop reason: HOSPADM

## 2022-06-06 RX ORDER — METHYLERGONOVINE MALEATE 0.2 MG/ML
200 INJECTION INTRAVENOUS
Status: DISCONTINUED | OUTPATIENT
Start: 2022-06-06 | End: 2022-06-08 | Stop reason: HOSPADM

## 2022-06-06 RX ORDER — PENICILLIN G 3000000 [IU]/50ML
3 INJECTION, SOLUTION INTRAVENOUS EVERY 4 HOURS
Status: DISCONTINUED | OUTPATIENT
Start: 2022-06-06 | End: 2022-06-06 | Stop reason: HOSPADM

## 2022-06-06 RX ORDER — PROCHLORPERAZINE 25 MG
25 SUPPOSITORY, RECTAL RECTAL EVERY 12 HOURS PRN
Status: DISCONTINUED | OUTPATIENT
Start: 2022-06-06 | End: 2022-06-06 | Stop reason: HOSPADM

## 2022-06-06 RX ORDER — IBUPROFEN 800 MG/1
800 TABLET, FILM COATED ORAL
Status: DISCONTINUED | OUTPATIENT
Start: 2022-06-06 | End: 2022-06-06

## 2022-06-06 RX ORDER — MISOPROSTOL 200 UG/1
400 TABLET ORAL
Status: DISCONTINUED | OUTPATIENT
Start: 2022-06-06 | End: 2022-06-06 | Stop reason: HOSPADM

## 2022-06-06 RX ORDER — CARBOPROST TROMETHAMINE 250 UG/ML
250 INJECTION, SOLUTION INTRAMUSCULAR
Status: DISCONTINUED | OUTPATIENT
Start: 2022-06-06 | End: 2022-06-08 | Stop reason: HOSPADM

## 2022-06-06 RX ORDER — OXYTOCIN 10 [USP'U]/ML
10 INJECTION, SOLUTION INTRAMUSCULAR; INTRAVENOUS
Status: DISCONTINUED | OUTPATIENT
Start: 2022-06-06 | End: 2022-06-08 | Stop reason: HOSPADM

## 2022-06-06 RX ORDER — CITRIC ACID/SODIUM CITRATE 334-500MG
30 SOLUTION, ORAL ORAL
Status: DISCONTINUED | OUTPATIENT
Start: 2022-06-06 | End: 2022-06-06 | Stop reason: HOSPADM

## 2022-06-06 RX ORDER — NALOXONE HYDROCHLORIDE 0.4 MG/ML
0.4 INJECTION, SOLUTION INTRAMUSCULAR; INTRAVENOUS; SUBCUTANEOUS
Status: DISCONTINUED | OUTPATIENT
Start: 2022-06-06 | End: 2022-06-06 | Stop reason: HOSPADM

## 2022-06-06 RX ORDER — HYDROCORTISONE 25 MG/G
CREAM TOPICAL 3 TIMES DAILY PRN
Status: DISCONTINUED | OUTPATIENT
Start: 2022-06-06 | End: 2022-06-08 | Stop reason: HOSPADM

## 2022-06-06 RX ORDER — FENTANYL CITRATE 50 UG/ML
100 INJECTION, SOLUTION INTRAMUSCULAR; INTRAVENOUS
Status: DISCONTINUED | OUTPATIENT
Start: 2022-06-06 | End: 2022-06-06 | Stop reason: HOSPADM

## 2022-06-06 RX ORDER — DOCUSATE SODIUM 100 MG/1
100 CAPSULE, LIQUID FILLED ORAL DAILY
Status: DISCONTINUED | OUTPATIENT
Start: 2022-06-07 | End: 2022-06-08 | Stop reason: HOSPADM

## 2022-06-06 RX ORDER — OXYTOCIN/0.9 % SODIUM CHLORIDE 30/500 ML
340 PLASTIC BAG, INJECTION (ML) INTRAVENOUS CONTINUOUS PRN
Status: DISCONTINUED | OUTPATIENT
Start: 2022-06-06 | End: 2022-06-06 | Stop reason: HOSPADM

## 2022-06-06 RX ORDER — KETOROLAC TROMETHAMINE 30 MG/ML
30 INJECTION, SOLUTION INTRAMUSCULAR; INTRAVENOUS
Status: DISCONTINUED | OUTPATIENT
Start: 2022-06-06 | End: 2022-06-06

## 2022-06-06 RX ORDER — METOCLOPRAMIDE 10 MG/1
10 TABLET ORAL EVERY 6 HOURS PRN
Status: DISCONTINUED | OUTPATIENT
Start: 2022-06-06 | End: 2022-06-06 | Stop reason: HOSPADM

## 2022-06-06 RX ORDER — METHYLERGONOVINE MALEATE 0.2 MG/ML
200 INJECTION INTRAVENOUS
Status: DISCONTINUED | OUTPATIENT
Start: 2022-06-06 | End: 2022-06-06 | Stop reason: HOSPADM

## 2022-06-06 RX ORDER — LIDOCAINE 40 MG/G
CREAM TOPICAL
Status: DISCONTINUED | OUTPATIENT
Start: 2022-06-06 | End: 2022-06-06 | Stop reason: HOSPADM

## 2022-06-06 RX ORDER — ONDANSETRON 4 MG/1
4 TABLET, ORALLY DISINTEGRATING ORAL EVERY 6 HOURS PRN
Status: DISCONTINUED | OUTPATIENT
Start: 2022-06-06 | End: 2022-06-06 | Stop reason: HOSPADM

## 2022-06-06 RX ORDER — NALOXONE HYDROCHLORIDE 0.4 MG/ML
0.2 INJECTION, SOLUTION INTRAMUSCULAR; INTRAVENOUS; SUBCUTANEOUS
Status: DISCONTINUED | OUTPATIENT
Start: 2022-06-06 | End: 2022-06-06 | Stop reason: HOSPADM

## 2022-06-06 RX ORDER — OXYTOCIN 10 [USP'U]/ML
10 INJECTION, SOLUTION INTRAMUSCULAR; INTRAVENOUS
Status: DISCONTINUED | OUTPATIENT
Start: 2022-06-06 | End: 2022-06-06

## 2022-06-06 RX ORDER — OXYTOCIN/0.9 % SODIUM CHLORIDE 30/500 ML
100-340 PLASTIC BAG, INJECTION (ML) INTRAVENOUS CONTINUOUS PRN
Status: DISCONTINUED | OUTPATIENT
Start: 2022-06-06 | End: 2022-06-06

## 2022-06-06 RX ORDER — METOCLOPRAMIDE HYDROCHLORIDE 5 MG/ML
10 INJECTION INTRAMUSCULAR; INTRAVENOUS EVERY 6 HOURS PRN
Status: DISCONTINUED | OUTPATIENT
Start: 2022-06-06 | End: 2022-06-06 | Stop reason: HOSPADM

## 2022-06-06 RX ORDER — PROCHLORPERAZINE MALEATE 10 MG
10 TABLET ORAL EVERY 6 HOURS PRN
Status: DISCONTINUED | OUTPATIENT
Start: 2022-06-06 | End: 2022-06-06 | Stop reason: HOSPADM

## 2022-06-06 RX ORDER — OXYTOCIN 10 [USP'U]/ML
10 INJECTION, SOLUTION INTRAMUSCULAR; INTRAVENOUS
Status: DISCONTINUED | OUTPATIENT
Start: 2022-06-06 | End: 2022-06-06 | Stop reason: HOSPADM

## 2022-06-06 RX ORDER — MODIFIED LANOLIN
OINTMENT (GRAM) TOPICAL
Status: DISCONTINUED | OUTPATIENT
Start: 2022-06-06 | End: 2022-06-08 | Stop reason: HOSPADM

## 2022-06-06 RX ORDER — MISOPROSTOL 200 UG/1
800 TABLET ORAL
Status: DISCONTINUED | OUTPATIENT
Start: 2022-06-06 | End: 2022-06-06 | Stop reason: HOSPADM

## 2022-06-06 RX ORDER — TRANEXAMIC ACID 10 MG/ML
1 INJECTION, SOLUTION INTRAVENOUS EVERY 30 MIN PRN
Status: DISCONTINUED | OUTPATIENT
Start: 2022-06-06 | End: 2022-06-06 | Stop reason: HOSPADM

## 2022-06-06 RX ORDER — CARBOPROST TROMETHAMINE 250 UG/ML
250 INJECTION, SOLUTION INTRAMUSCULAR
Status: DISCONTINUED | OUTPATIENT
Start: 2022-06-06 | End: 2022-06-06 | Stop reason: HOSPADM

## 2022-06-06 RX ORDER — ACETAMINOPHEN 325 MG/1
650 TABLET ORAL EVERY 4 HOURS PRN
Status: DISCONTINUED | OUTPATIENT
Start: 2022-06-06 | End: 2022-06-08 | Stop reason: HOSPADM

## 2022-06-06 RX ORDER — TRANEXAMIC ACID 10 MG/ML
1 INJECTION, SOLUTION INTRAVENOUS EVERY 30 MIN PRN
Status: DISCONTINUED | OUTPATIENT
Start: 2022-06-06 | End: 2022-06-08 | Stop reason: HOSPADM

## 2022-06-06 RX ORDER — SODIUM CHLORIDE, SODIUM LACTATE, POTASSIUM CHLORIDE, CALCIUM CHLORIDE 600; 310; 30; 20 MG/100ML; MG/100ML; MG/100ML; MG/100ML
INJECTION, SOLUTION INTRAVENOUS CONTINUOUS
Status: DISCONTINUED | OUTPATIENT
Start: 2022-06-06 | End: 2022-06-06 | Stop reason: HOSPADM

## 2022-06-06 RX ORDER — MISOPROSTOL 200 UG/1
800 TABLET ORAL
Status: DISCONTINUED | OUTPATIENT
Start: 2022-06-06 | End: 2022-06-08 | Stop reason: HOSPADM

## 2022-06-06 RX ORDER — PENICILLIN G POTASSIUM 5000000 [IU]/1
5 INJECTION, POWDER, FOR SOLUTION INTRAMUSCULAR; INTRAVENOUS ONCE
Status: COMPLETED | OUTPATIENT
Start: 2022-06-06 | End: 2022-06-06

## 2022-06-06 RX ORDER — MISOPROSTOL 200 UG/1
400 TABLET ORAL
Status: DISCONTINUED | OUTPATIENT
Start: 2022-06-06 | End: 2022-06-08 | Stop reason: HOSPADM

## 2022-06-06 RX ADMIN — PENICILLIN G 3 MILLION UNITS: 3000000 INJECTION, SOLUTION INTRAVENOUS at 19:06

## 2022-06-06 RX ADMIN — SODIUM CHLORIDE, POTASSIUM CHLORIDE, SODIUM LACTATE AND CALCIUM CHLORIDE: 600; 310; 30; 20 INJECTION, SOLUTION INTRAVENOUS at 15:12

## 2022-06-06 RX ADMIN — KETOROLAC TROMETHAMINE 30 MG: 30 INJECTION, SOLUTION INTRAMUSCULAR at 22:55

## 2022-06-06 RX ADMIN — LIDOCAINE HYDROCHLORIDE 20 ML: 10 INJECTION, SOLUTION EPIDURAL; INFILTRATION; INTRACAUDAL; PERINEURAL at 22:18

## 2022-06-06 RX ADMIN — Medication 340 ML/HR: at 22:16

## 2022-06-06 RX ADMIN — PENICILLIN G POTASSIUM 5 MILLION UNITS: 5000000 POWDER, FOR SOLUTION INTRAMUSCULAR; INTRAPLEURAL; INTRATHECAL; INTRAVENOUS at 15:12

## 2022-06-06 ASSESSMENT — ACTIVITIES OF DAILY LIVING (ADL)
ADLS_ACUITY_SCORE: 18
ADLS_ACUITY_SCORE: 35
ADLS_ACUITY_SCORE: 18

## 2022-06-06 NOTE — PROVIDER NOTIFICATION
06/06/22 1850   Provider Notification   Provider Name/Title Dr. Chow   Method of Notification Phone   Request Evaluate - Remote   Notification Reason Status Update     RN updated MD that patient is still comfortable/not actively laboring. 2nd dose of penicillin due at 1900, patient desires that her labor be augmented with AROM once adequately treated.     Order received to check cervix before next dose of abx and text page MD with update. MD will come to unit between [id number] to AROM patient. RN will update MD with any concerns. Patient and partner agreeable with POC.

## 2022-06-06 NOTE — PROVIDER NOTIFICATION
06/06/22 1640   Provider Notification   Provider Name/Title Dr. Francisco   Method of Notification Phone   Request Evaluate - Remote   Notification Reason Status Update     MD called RN for update. RN informed MD of patient status, POC per Dr. Gregory and Dr. Fitzgerald. No new orders at this time; Dr. Chow will assume cares at 1800.

## 2022-06-06 NOTE — H&P
Chevy Squires  [id number]  OB Admit History & Physical      HPI:  Ms. Squires  is a 36 year old  @ 38w6d by LMP who presented to L&D for advanced dilation.      Prenatal course:  1st visit at 10 weeks, regular care, TWG 24#.    Pregnancy complications:  Velamentous cord insertion, GBS+urine, h/o advance dilation and precipitous delivery. Resolved polyhydramnios.     Prenatal labs:  AB+, antibody screen negative,  Rubella  Immune, Hep B/HIV/RPR all negative, GC/CT negative, GCT 99,  GBS urine positve; genetic screening tests negative     OB history:   OB History    Para Term  AB Living   4 1 1 0 2 1   SAB IAB Ectopic Multiple Live Births   2 0 0 0 1      # Outcome Date GA Lbr Tino/2nd Weight Sex Delivery Anes PTL Lv   4 Current            3 Term 19 39w0d 05:20 / 01:02 3.395 kg (7 lb 7.8 oz) F Vag-Spont Nitrous  JOSE LUIS      Complications: GBS      Name: JILLIANFEMALE-CHEVY      Apgar1: 9  Apgar5: 9   2 SAB            1 SAB                  PMHx:     Past Medical History:   Diagnosis Date     Depressive disorder     hx of depression 10 yrs ago       PSHX:  History reviewed. No pertinent surgical history.    Meds:    No current outpatient medications on file.       Allergies: Patient has no known allergies.      REVIEW OF SYSTEMS:  Positives and negatives in HPI.     SocHx:    Social History     Socioeconomic History     Marital status:      Spouse name: Not on file     Number of children: Not on file     Years of education: Not on file     Highest education level: Not on file   Occupational History     Not on file   Tobacco Use     Smoking status: Never Smoker     Smokeless tobacco: Never Used   Substance and Sexual Activity     Alcohol use: Not Currently     Drug use: Never     Sexual activity: Yes     Partners: Male     Birth control/protection: None   Other Topics Concern     Not on file   Social History Narrative     Not on file     Social Determinants of Health     Financial  "Resource Strain: Not on file   Food Insecurity: Not on file   Transportation Needs: Not on file   Physical Activity: Not on file   Stress: Not on file   Social Connections: Not on file   Intimate Partner Violence: Not on file   Housing Stability: Not on file        Fam Hx:  History reviewed. No pertinent family history.      PHYSICAL EXAM:      Vitals:  /66 (BP Location: Right arm, Patient Position: Semi-Fraire's, Cuff Size: Adult Regular)   Pulse 100   Temp 98.3  F (36.8  C) (Oral)   Resp 16   Ht 1.727 m (5' 8\")   Wt 78.9 kg (174 lb)   BMI 26.46 kg/m    Alert Awake in NAD  ABD gravid, non-tender, EFW 8#  Cervix:  6 cm / 90 % effaced at 0 station, membranes intact  EFM:  Baseline 135, with moderate variability, accels present, no decels  [place]: contractions q5-10 min    Assessment:  IUP at 38w6d admitted for advance dilation and precipitous delivery. GBS+ in urine. Velamentous cord insertion. Serial growths and BPP WNL.     Plan:  Admission            Continuous fetal and uterine monitoring   GBS prophylaxis started. Induction was fully treated or in the am.            Analgesia per pt request.             The plan of care was discussed with the patient and her partner.  They expressed understanding and agreement.             Anticipate        Janice Francisco MD   Dept of OB/GYN  2022   "

## 2022-06-06 NOTE — PLAN OF CARE
"Data: Patient presented to [facility]: 2022  2:26 PM.  Reason for maternal/fetal assessment is advanced dilation. Patient was seen in the clinic this AM and called  by MD. Patient has a history of precipitous labor and is GBS +, so she was sent to L&D to begin abx treatment. She states that she feels \"uncomfortable, but not like I'm in labor.\" Patient is a .  Prenatal record reviewed. Pregnancy velamentous cord insertion, COVID-19 infection in pregnancy x2, recovered.   Gestational Age 38w6d. VSS. Fetal movement present. Patient denies leaking of vaginal fluid/rupture of membranes, vaginal bleeding, abdominal pain, pelvic pressure, nausea, vomiting, headache, visual disturbances, epigastric or URQ pain, significant edema. Support person is present.   Action: Verbal consent for EFM. Triage assessment completed. Bill of rights reviewed.  Response: Patient verbalized agreement with plan. Order received via telephone from Dr. Gregory to admit patient to L&D, collect admission labs, start IV and begin penicillin protocol, with the goal of treating GBS adequately before labor progresses/augmentation occurs. RN will update MD when patient is settled.   "

## 2022-06-06 NOTE — PROVIDER NOTIFICATION
06/06/22 1545   Provider Notification   Provider Name/Title Dr. Fitzgerald   Method of Notification In Department   Request Evaluate - Remote   Notification Reason Patient Arrived;SVE;Status Update     RN updated Dr. Fitzgerald (in department) that patient arrived and is currently receiving her first dose of penicillin. FHR tracing category I, ctx occurring Q5-7, patient is relaxed and talking through them.VSS. Patient is planning unmedicated delivery.    Order received to allow patient to ambulate and eat as desired. NST Q shift or as indicated per RN assessment. RN will update MD after 2nd dose of abx has been administered to form further POC. Patient and partner are agreeable with POC.

## 2022-06-07 LAB
HGB BLD-MCNC: 9.3 G/DL (ref 11.7–15.7)
T PALLIDUM AB SER QL: NONREACTIVE

## 2022-06-07 PROCEDURE — 250N000013 HC RX MED GY IP 250 OP 250 PS 637: Performed by: OBSTETRICS & GYNECOLOGY

## 2022-06-07 PROCEDURE — 36415 COLL VENOUS BLD VENIPUNCTURE: CPT | Performed by: OBSTETRICS & GYNECOLOGY

## 2022-06-07 PROCEDURE — 120N000001 HC R&B MED SURG/OB

## 2022-06-07 PROCEDURE — 85018 HEMOGLOBIN: CPT | Performed by: OBSTETRICS & GYNECOLOGY

## 2022-06-07 RX ADMIN — IBUPROFEN 800 MG: 800 TABLET, FILM COATED ORAL at 11:57

## 2022-06-07 RX ADMIN — ACETAMINOPHEN 650 MG: 325 TABLET ORAL at 17:18

## 2022-06-07 RX ADMIN — ACETAMINOPHEN 650 MG: 325 TABLET ORAL at 01:19

## 2022-06-07 RX ADMIN — ACETAMINOPHEN 650 MG: 325 TABLET ORAL at 21:39

## 2022-06-07 RX ADMIN — IBUPROFEN 800 MG: 800 TABLET, FILM COATED ORAL at 05:15

## 2022-06-07 RX ADMIN — ACETAMINOPHEN 650 MG: 325 TABLET ORAL at 10:06

## 2022-06-07 RX ADMIN — ACETAMINOPHEN 650 MG: 325 TABLET ORAL at 05:15

## 2022-06-07 RX ADMIN — IBUPROFEN 800 MG: 800 TABLET, FILM COATED ORAL at 17:58

## 2022-06-07 RX ADMIN — DOCUSATE SODIUM 100 MG: 100 CAPSULE, LIQUID FILLED ORAL at 10:06

## 2022-06-07 ASSESSMENT — ACTIVITIES OF DAILY LIVING (ADL)
ADLS_ACUITY_SCORE: 18
ADLS_ACUITY_SCORE: 19
ADLS_ACUITY_SCORE: 18
ADLS_ACUITY_SCORE: 18

## 2022-06-07 NOTE — PLAN OF CARE
Pt arrived to the unit a little past midnight with baby girl in arms and  with personal belongings. Oriented to room, call light, supplies in bathroom and bassinet ABCs of safe sleep reviewed. Pt is up independently. IV is saline locked. Pt is bonding well with baby girl. Breastfeeding is going well per pt, does not need any help and baby latches well. Fundus is firm, midline and 2 cm below umbilicus. Lochia is scant, rubra and pt denies having any clots. Intermittently using ice packs and tucks pads for pain and swelling.  VSS. Voiding and passing gas. Ambulating independently.Tolerating perineal pain well with, tylenol, Toradol and ibuprofen- rating a 3/10.

## 2022-06-07 NOTE — PROVIDER NOTIFICATION
06/06/22 2029   Provider Notification   Provider Name/Title Dr. Chow   Method of Notification At Bedside   Request Evaluate in Person   Notification Reason SVE;Status Update;Membrane Status     MD at bedside. SVE 8/90/0, AROM at 2033 with bloody show and clear fluid. Patient and baby tolerated procedure well. MD will remain on unit for delivery.

## 2022-06-07 NOTE — PROVIDER NOTIFICATION
"   06/06/22 4050   Vaginal Exam   Method sterile exam per physician   Cervical Dilation (cm) 10     Patient called out stating \"I think I need to push now.\" RN and MD at bedside, SVE 10/100/+1. Will set up to push.   "

## 2022-06-07 NOTE — PROVIDER NOTIFICATION
"   06/06/22 1928   Provider Notification   Provider Name/Title Dr. Chow   Method of Notification Phone   Request Evaluate - Remote   Notification Reason SVE;Status Update     1925: RN sent text page to MD: \"SVE 8/95/0, second dose of penicillin almost complete. Plan for AROM around 2000, please call if this plan wont work for you. Thank you!\"    1930: MD called back to say she is evaluating a patient at [facility] and will come to [facility] for AROM. RN will call MD if needed sooner. IHOB available if needed.   "

## 2022-06-07 NOTE — PROGRESS NOTES
"OB Post-partum Note  PPD# 1    S:  Patient doing well.  Pain controlled.  Voiding.  Bleeding is normal.  Breast feeding.    O:  /71 (BP Location: Left arm, Patient Position: Semi-Fraire's, Cuff Size: Adult Regular)   Pulse 75   Temp 97.9  F (36.6  C) (Oral)   Resp 16   Ht 1.727 m (5' 8\")   Wt 78.9 kg (174 lb)   Breastfeeding yes BMI 26.46 kg/m    Gen- A&O, NAD  Abd- Non-tender, fundus non tender and firm   Ext- non-tender, no calf pain    Hemoglobin   Date Value Ref Range Status   2022 9.3 (L) 11.7 - 15.7 g/dL Final     AB POS  Rubella Immune  covid neg    A/P: 36 year old  PPD# 1 s/p     1.  Routine post-partum cares  2.  Analgesia tylenol and ibuprofen  3.  Discharge tomorrow  4.  The plan of care was discussed with the patient.  She expressed understanding and agreement  5.  Acute blood loss anemia - Fe supplement on discharge  6. S/P Tdap      Debbie Kaminski MD  2022  8:35 AM  "

## 2022-06-07 NOTE — L&D DELIVERY NOTE
OB Delivery Summary      HISTORY OF PRESENT ILLNESS:   with  IUP @ 38w6d who presented with advanced cervical dilation, possible early labor at 6cm.  Her prenatal course was  complicated by: velamentous cord insertion, GBS positive, polyhydramnios and history of precipitous delivery .  Prenatal labs were significant for blood type AB pos, rubella status immune.  Glucose challenge test 99.  Group beta strep culture was positive (urine).  Estimated fetal weight on admission was approximately 8lb.        FIRST STAGE OF LABOR:  The patient was admitted to Labor and Delivery with a fetal heart rate tracing that was category I. Cervix was 6/90/0 and she felt occasional contractions, non-painful. Penicillin was started for GBS prophylaxis. Approximately 4 hours later, her cervix was 8cm dilated. Amniotomy was performed at 8:33pm for clear fluid. She then progressed to complete dilation at 9:55pm, unmedicated.      SECOND STAGE OF LABOR:  The patient began pushing at 9:58pm from the 0 station.  She gradually brought the vertex down in the birth canal and delivered a viable female infant at 10:14pm on 22.  After delivery of the head, the anterior shoulder and body delivered without difficulty.  No nuchal cord was noted. The infant was placed on the mother's chest.  Delayed cord clamping was performed.        THIRD STAGE OF LABOR:  The cord was clamped and cut. The placenta then delivered spontaneously and appeared intact with a 3-vessel cord at 10:17pm.  Pitocin was started and fundal massage was performed.  Perineum was inspected and a second degree laceration was noted. It was repaired with a running suture of 3-0 Vicryl after infiltration with 1% plain lidocaine. Quantitative blood loss for the delivery was 300cc.       Both mother and baby tolerated delivery well and there were no complications. Fetal weight was 4sz50fm and Apgars were 8 and 9 at 1 and 5 minutes, respectively.       Ange Chow  MD  6/6/2022  10:31 PM

## 2022-06-07 NOTE — PLAN OF CARE
VSS, Postpartum checks WDL. Up ad john, voiding without difficulty. Tolerating reg diet and activity. Breastfeeding independently and infant has been feeding frequently throughout shift. Ibuprofen given for cramping pain with good relief. FOB at bedside and is supportive.

## 2022-06-07 NOTE — PROGRESS NOTES
"OB Progress Note  2022  8:36 PM    S:  Feeling pressure and mild contractions. Active baby.    O:  /57 (BP Location: Right arm, Patient Position: Semi-Fraire's, Cuff Size: Adult Regular)   Pulse 100   Temp 98.8  F (37.1  C) (Oral)   Resp 18   Ht 1.727 m (5' 8\")   Wt 78.9 kg (174 lb)   Breastfeeding No   BMI 26.46 kg/m    EFM: baseline 135, accelerations present, absent decelerations, moderate variability; Category I  [place]:  Ctx q5-8 min  SVE:  8/90%/0  Membranes: AROM now, clear/blood tinged fluid      A/P:  36 year old  @38w6d admitted with advanced cervical dilation, early labor  - Now s/p adequate GBS treatment. AROM now, anticipate   - Plans unmedicated delivery    Ange Chow MD    "

## 2022-06-08 VITALS
DIASTOLIC BLOOD PRESSURE: 65 MMHG | TEMPERATURE: 97.6 F | RESPIRATION RATE: 16 BRPM | SYSTOLIC BLOOD PRESSURE: 110 MMHG | HEIGHT: 68 IN | HEART RATE: 69 BPM | BODY MASS INDEX: 26.37 KG/M2 | WEIGHT: 174 LBS

## 2022-06-08 PROCEDURE — 250N000013 HC RX MED GY IP 250 OP 250 PS 637: Performed by: OBSTETRICS & GYNECOLOGY

## 2022-06-08 RX ORDER — FERROUS SULFATE 325(65) MG
325 TABLET ORAL
Qty: 90 TABLET | Refills: 0 | Status: SHIPPED | OUTPATIENT
Start: 2022-06-08

## 2022-06-08 RX ORDER — DOCUSATE SODIUM 100 MG/1
100 CAPSULE, LIQUID FILLED ORAL DAILY
Qty: 40 CAPSULE | Refills: 0 | Status: SHIPPED | OUTPATIENT
Start: 2022-06-08

## 2022-06-08 RX ADMIN — BENZOCAINE: 11.4 AEROSOL, SPRAY TOPICAL at 02:43

## 2022-06-08 RX ADMIN — DOCUSATE SODIUM 100 MG: 100 CAPSULE, LIQUID FILLED ORAL at 09:51

## 2022-06-08 RX ADMIN — IBUPROFEN 800 MG: 800 TABLET, FILM COATED ORAL at 09:51

## 2022-06-08 RX ADMIN — IBUPROFEN 800 MG: 800 TABLET, FILM COATED ORAL at 02:44

## 2022-06-08 RX ADMIN — ACETAMINOPHEN 650 MG: 325 TABLET ORAL at 02:44

## 2022-06-08 RX ADMIN — ACETAMINOPHEN 650 MG: 325 TABLET ORAL at 07:47

## 2022-06-08 ASSESSMENT — ACTIVITIES OF DAILY LIVING (ADL)
ADLS_ACUITY_SCORE: 18

## 2022-06-08 NOTE — PLAN OF CARE
V/S, voiding without difficulty, light lochia-passed small dime size clot during shift, pain being managed with ibuprofen and tylenol, breastfeeding well independently- encouraged to call nurse for assistance, pt is independent with self and baby cares and is bonding appropriately with baby, spouse is at the bedside and is supportive and active in baby cares, continue with plan of care.

## 2022-06-08 NOTE — PROGRESS NOTES
OB Post-partum Note  PPD#2    S:  Patient doing well.  Pain is well controlled.  Voiding. Ambulating. Bleeding is normal.  Breast and bottlefeeding.    O:    Vitals:    22 1006 22 1718 22 0234 22 0748   BP: 119/68 98/62 118/65 110/65   BP Location: Left arm Left arm Right arm Left arm   Patient Position: Semi-Fraire's Semi-Fraire's Semi-Fraire's Semi-Fraire's   Cuff Size: Adult Regular Adult Regular Adult Regular Adult Regular   Pulse: 79 71 78 69   Resp: 16 18 16 16   Temp: 98.1  F (36.7  C) 98  F (36.7  C) 98.3  F (36.8  C) 97.6  F (36.4  C)   TempSrc: Oral Oral Oral Oral   Weight:       Height:         Gen- A&O, NAD  Abd- Non-tender, fundus non tender and firm   Ext- non-tender, no calf pain, scant edema    Hemoglobin   Date Value Ref Range Status   2022 9.3 (L) 11.7 - 15.7 g/dL Final     AB POS  Rubella Immune  covid neg    A/P: 36 year old  PPD#2 s/p . Doing well.     - Routine post-partum cares  - Analgesia tylenol and ibuprofen  - Acute blood loss anemia. Hgb 9.3. Asymptomatic. Fe supplement on discharge  - Rh pos, Rubella immune  - S/P Tdap    Dispo: Home today. Reviewed discharge precautions. RTC in 6 weeks    Etta Cuello MD  2022

## 2022-06-08 NOTE — PLAN OF CARE
Patient is doing well - all anticipated goals were met.  Pain is well-controlled.  No fevers.  No history of foul-smelling vaginal discharge.  Good appetite.  Denies chest pain, shortness of breath, nausea or vomiting.  Vaginal bleeding is scant.  Patient is ambulatory. Breastfeeding has been going well.  Mother and father bonding well with infant. Discharge instructions given/discussed and all questions answered. Mother and father discharged with  at 11:00.

## 2022-09-17 ENCOUNTER — HEALTH MAINTENANCE LETTER (OUTPATIENT)
Age: 37
End: 2022-09-17

## 2023-06-04 ENCOUNTER — HEALTH MAINTENANCE LETTER (OUTPATIENT)
Age: 38
End: 2023-06-04
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
## 2024-07-14 ENCOUNTER — HEALTH MAINTENANCE LETTER (OUTPATIENT)
Age: 39
End: 2024-07-14